# Patient Record
Sex: FEMALE | Race: WHITE | NOT HISPANIC OR LATINO | URBAN - METROPOLITAN AREA
[De-identification: names, ages, dates, MRNs, and addresses within clinical notes are randomized per-mention and may not be internally consistent; named-entity substitution may affect disease eponyms.]

---

## 2017-10-25 ENCOUNTER — OUTPATIENT (OUTPATIENT)
Dept: OUTPATIENT SERVICES | Facility: HOSPITAL | Age: 82
LOS: 1 days | Discharge: HOME | End: 2017-10-25

## 2017-10-25 DIAGNOSIS — Z00.00 ENCOUNTER FOR GENERAL ADULT MEDICAL EXAMINATION WITHOUT ABNORMAL FINDINGS: ICD-10-CM

## 2021-08-16 ENCOUNTER — INPATIENT (INPATIENT)
Facility: HOSPITAL | Age: 86
LOS: 1 days | Discharge: HOME | End: 2021-08-18
Attending: HOSPITALIST | Admitting: HOSPITALIST
Payer: MEDICARE

## 2021-08-16 VITALS
RESPIRATION RATE: 18 BRPM | OXYGEN SATURATION: 99 % | TEMPERATURE: 99 F | HEART RATE: 135 BPM | SYSTOLIC BLOOD PRESSURE: 210 MMHG | DIASTOLIC BLOOD PRESSURE: 87 MMHG

## 2021-08-16 LAB
ALBUMIN SERPL ELPH-MCNC: 4.3 G/DL — SIGNIFICANT CHANGE UP (ref 3.5–5.2)
ALP SERPL-CCNC: 85 U/L — SIGNIFICANT CHANGE UP (ref 30–115)
ALT FLD-CCNC: 9 U/L — SIGNIFICANT CHANGE UP (ref 0–41)
ANION GAP SERPL CALC-SCNC: 12 MMOL/L — SIGNIFICANT CHANGE UP (ref 7–14)
APPEARANCE UR: ABNORMAL
AST SERPL-CCNC: 15 U/L — SIGNIFICANT CHANGE UP (ref 0–41)
BACTERIA # UR AUTO: NEGATIVE — SIGNIFICANT CHANGE UP
BASOPHILS # BLD AUTO: 0.03 K/UL — SIGNIFICANT CHANGE UP (ref 0–0.2)
BASOPHILS NFR BLD AUTO: 0.3 % — SIGNIFICANT CHANGE UP (ref 0–1)
BILIRUB SERPL-MCNC: 1.2 MG/DL — SIGNIFICANT CHANGE UP (ref 0.2–1.2)
BILIRUB UR-MCNC: NEGATIVE — SIGNIFICANT CHANGE UP
BUN SERPL-MCNC: 10 MG/DL — SIGNIFICANT CHANGE UP (ref 10–20)
CALCIUM SERPL-MCNC: 9.5 MG/DL — SIGNIFICANT CHANGE UP (ref 8.5–10.1)
CHLORIDE SERPL-SCNC: 102 MMOL/L — SIGNIFICANT CHANGE UP (ref 98–110)
CO2 SERPL-SCNC: 26 MMOL/L — SIGNIFICANT CHANGE UP (ref 17–32)
COLOR SPEC: YELLOW — SIGNIFICANT CHANGE UP
CREAT SERPL-MCNC: 0.8 MG/DL — SIGNIFICANT CHANGE UP (ref 0.7–1.5)
D DIMER BLD IA.RAPID-MCNC: 298 NG/ML DDU — HIGH (ref 0–230)
DIFF PNL FLD: ABNORMAL
EOSINOPHIL # BLD AUTO: 0 K/UL — SIGNIFICANT CHANGE UP (ref 0–0.7)
EOSINOPHIL NFR BLD AUTO: 0 % — SIGNIFICANT CHANGE UP (ref 0–8)
EPI CELLS # UR: 1 /HPF — SIGNIFICANT CHANGE UP (ref 0–5)
GLUCOSE SERPL-MCNC: 130 MG/DL — HIGH (ref 70–99)
GLUCOSE UR QL: NEGATIVE — SIGNIFICANT CHANGE UP
HCT VFR BLD CALC: 42.6 % — SIGNIFICANT CHANGE UP (ref 37–47)
HGB BLD-MCNC: 14.3 G/DL — SIGNIFICANT CHANGE UP (ref 12–16)
HYALINE CASTS # UR AUTO: 4 /LPF — SIGNIFICANT CHANGE UP (ref 0–7)
IMM GRANULOCYTES NFR BLD AUTO: 0.3 % — SIGNIFICANT CHANGE UP (ref 0.1–0.3)
KETONES UR-MCNC: SIGNIFICANT CHANGE UP
LEUKOCYTE ESTERASE UR-ACNC: ABNORMAL
LIDOCAIN IGE QN: 46 U/L — SIGNIFICANT CHANGE UP (ref 7–60)
LYMPHOCYTES # BLD AUTO: 1.76 K/UL — SIGNIFICANT CHANGE UP (ref 1.2–3.4)
LYMPHOCYTES # BLD AUTO: 18.8 % — LOW (ref 20.5–51.1)
MAGNESIUM SERPL-MCNC: 1.7 MG/DL — LOW (ref 1.8–2.4)
MCHC RBC-ENTMCNC: 28.4 PG — SIGNIFICANT CHANGE UP (ref 27–31)
MCHC RBC-ENTMCNC: 33.6 G/DL — SIGNIFICANT CHANGE UP (ref 32–37)
MCV RBC AUTO: 84.5 FL — SIGNIFICANT CHANGE UP (ref 81–99)
MONOCYTES # BLD AUTO: 0.61 K/UL — HIGH (ref 0.1–0.6)
MONOCYTES NFR BLD AUTO: 6.5 % — SIGNIFICANT CHANGE UP (ref 1.7–9.3)
NEUTROPHILS # BLD AUTO: 6.93 K/UL — HIGH (ref 1.4–6.5)
NEUTROPHILS NFR BLD AUTO: 74.1 % — SIGNIFICANT CHANGE UP (ref 42.2–75.2)
NITRITE UR-MCNC: NEGATIVE — SIGNIFICANT CHANGE UP
NRBC # BLD: 0 /100 WBCS — SIGNIFICANT CHANGE UP (ref 0–0)
NT-PROBNP SERPL-SCNC: 274 PG/ML — SIGNIFICANT CHANGE UP (ref 0–300)
PH UR: 6 — SIGNIFICANT CHANGE UP (ref 5–8)
PLATELET # BLD AUTO: 212 K/UL — SIGNIFICANT CHANGE UP (ref 130–400)
POTASSIUM SERPL-MCNC: 3.6 MMOL/L — SIGNIFICANT CHANGE UP (ref 3.5–5)
POTASSIUM SERPL-SCNC: 3.6 MMOL/L — SIGNIFICANT CHANGE UP (ref 3.5–5)
PROT SERPL-MCNC: 7.5 G/DL — SIGNIFICANT CHANGE UP (ref 6–8)
PROT UR-MCNC: ABNORMAL
RBC # BLD: 5.04 M/UL — SIGNIFICANT CHANGE UP (ref 4.2–5.4)
RBC # FLD: 13.2 % — SIGNIFICANT CHANGE UP (ref 11.5–14.5)
RBC CASTS # UR COMP ASSIST: 12 /HPF — HIGH (ref 0–4)
SARS-COV-2 RNA SPEC QL NAA+PROBE: SIGNIFICANT CHANGE UP
SODIUM SERPL-SCNC: 140 MMOL/L — SIGNIFICANT CHANGE UP (ref 135–146)
SP GR SPEC: 1.02 — SIGNIFICANT CHANGE UP (ref 1.01–1.03)
TROPONIN T SERPL-MCNC: <0.01 NG/ML — SIGNIFICANT CHANGE UP
UROBILINOGEN FLD QL: SIGNIFICANT CHANGE UP
WBC # BLD: 9.36 K/UL — SIGNIFICANT CHANGE UP (ref 4.8–10.8)
WBC # FLD AUTO: 9.36 K/UL — SIGNIFICANT CHANGE UP (ref 4.8–10.8)
WBC UR QL: 142 /HPF — HIGH (ref 0–5)

## 2021-08-16 PROCEDURE — 93010 ELECTROCARDIOGRAM REPORT: CPT

## 2021-08-16 PROCEDURE — 71045 X-RAY EXAM CHEST 1 VIEW: CPT | Mod: 26

## 2021-08-16 PROCEDURE — 71275 CT ANGIOGRAPHY CHEST: CPT | Mod: 26,MA

## 2021-08-16 PROCEDURE — 99285 EMERGENCY DEPT VISIT HI MDM: CPT

## 2021-08-16 RX ORDER — CEFTRIAXONE 500 MG/1
1000 INJECTION, POWDER, FOR SOLUTION INTRAMUSCULAR; INTRAVENOUS ONCE
Refills: 0 | Status: COMPLETED | OUTPATIENT
Start: 2021-08-16 | End: 2021-08-16

## 2021-08-16 RX ORDER — SODIUM CHLORIDE 9 MG/ML
1000 INJECTION, SOLUTION INTRAVENOUS ONCE
Refills: 0 | Status: COMPLETED | OUTPATIENT
Start: 2021-08-16 | End: 2021-08-16

## 2021-08-16 RX ORDER — KETOROLAC TROMETHAMINE 30 MG/ML
30 SYRINGE (ML) INJECTION ONCE
Refills: 0 | Status: DISCONTINUED | OUTPATIENT
Start: 2021-08-16 | End: 2021-08-16

## 2021-08-16 RX ORDER — MAGNESIUM SULFATE 500 MG/ML
2 VIAL (ML) INJECTION ONCE
Refills: 0 | Status: COMPLETED | OUTPATIENT
Start: 2021-08-16 | End: 2021-08-16

## 2021-08-16 RX ADMIN — CEFTRIAXONE 100 MILLIGRAM(S): 500 INJECTION, POWDER, FOR SOLUTION INTRAMUSCULAR; INTRAVENOUS at 21:18

## 2021-08-16 RX ADMIN — Medication 50 GRAM(S): at 21:18

## 2021-08-16 RX ADMIN — Medication 30 MILLIGRAM(S): at 19:10

## 2021-08-16 RX ADMIN — SODIUM CHLORIDE 1000 MILLILITER(S): 9 INJECTION, SOLUTION INTRAVENOUS at 19:25

## 2021-08-16 NOTE — ED PROVIDER NOTE - PHYSICAL EXAMINATION
CONSTITUTIONAL: Well-developed; well-nourished; in no acute distress.   SKIN: warm, dry  HEAD: Normocephalic; atraumatic.  EYES: no conjunctival injection. PERRLA. EOMI.   ENT: No nasal discharge; airway clear.  NECK: Supple; non tender.  CARD: S1, S2 normal; Regular rate and rhythm. +tachycardic   RESP: No wheezes, rales or rhonchi.  ABD: soft ntnd.   EXT: Normal ROM.  +mild LE edema. Distal pulses symmetric.   LYMPH: No acute cervical adenopathy.  NEURO: Alert, oriented, grossly unremarkable. No FND.   PSYCH: Cooperative, appropriate.

## 2021-08-16 NOTE — ED PROVIDER NOTE - NS ED ROS FT
Review of Systems:  CONSTITUTIONAL: +fever, No diaphoresis   SKIN: No rash  HEMATOLOGIC: No abnormal bleeding   EYES: No eye pain, No blurred vision  ENT: No sore throat, No neck pain, No rhinorrhea, No ear pain  RESPIRATORY: +shortness of breath, No cough  CARDIAC: +chest pain, No palpitations  GI: No abdominal pain, No nausea, No vomiting, No diarrhea, No constipation, No bright red blood per rectum or melena. No flank pain.   : +dysuria, frequency, no hematuria.   MUSCULOSKELETAL: No joint paint, +leg swelling, No back pain  NEUROLOGIC: No numbness, No focal weakness, No headache, No dizziness  All other systems negative, unless specified in HPI

## 2021-08-16 NOTE — ED PROVIDER NOTE - OBJECTIVE STATEMENT
88 y/o F PMHx HLD, hypothyroidism presents to ED with left sided chest pain radiating to left arm since yesterday that is intermittent and worse with taking deep breaths. Pain is moderate, squeezing and worse w/ exertion. Pt also reporting fever at home Tmax 102. Reporting dysuria and urinary frequency. Pt called GRAHAM Swartz who told pt to come in for evaluation.

## 2021-08-16 NOTE — ED PROVIDER NOTE - ATTENDING CONTRIBUTION TO CARE
I personally evaluated the patient. I reviewed the Resident’s or Physician Assistant’s note (as assigned above), and agree with the findings and plan except as documented in my note.    88 y/o female with PMHx of HTN, HLD, hypothyroidism, frequent UTIs presented to ED with left sided chest pain radiating to left arm and neck. No back pain. No SOB. No fevers, chills. No n/v.     CONSTITUTIONAL: Well-developed; well-nourished; in no acute distress. Sitting up and providing appropriate history and physical examination  SKIN: skin exam is warm and dry, no acute rash.  HEAD: Normocephalic; atraumatic.  EYES: PERRL, 3 mm bilateral, no nystagmus, EOM intact; conjunctiva and sclera clear.  ENT: No nasal discharge; airway clear.  NECK: Supple; non tender.+ full passive ROM in all directions. No JVD  CARD: S1, S2 normal; no murmurs, gallops, or rubs. Regular rate and rhythm. + Symmetric Strong Pulses  RESP: No wheezes, rales or rhonchi. Good air movement bilaterally  ABD: soft; non-distended; non-tender. No Rebound, No Gaurding, No signs of peritnitis, No CVA tenderness  EXT: Normal ROM. No clubbing, cyanosis or edema. Dp and Pt Pulses intact. Cap refill less than 3 seconds  NEURO: CN 2-12 intact, normal finger to nose, normal romberg, stable gait, no sensory or motor deficits, Alert, oriented, grossly unremarkable. No Focal deficits. GCS 15. NIH 0  PSYCH: Cooperative, appropriate.    Plan- ECG, labs, CXR, reassess

## 2021-08-16 NOTE — ED ADULT NURSE REASSESSMENT NOTE - NS ED NURSE REASSESS COMMENT FT1
Pt received from previous RN Pt assessed. Pt is awake and alert. Pt denies any pain or discomfort at this time. Fluids running. Cardiaic and 02 monitoring maintained. Safety and comfort maintained

## 2021-08-17 DIAGNOSIS — Z98.890 OTHER SPECIFIED POSTPROCEDURAL STATES: Chronic | ICD-10-CM

## 2021-08-17 LAB
A1C WITH ESTIMATED AVERAGE GLUCOSE RESULT: 5.3 % — SIGNIFICANT CHANGE UP (ref 4–5.6)
ALBUMIN SERPL ELPH-MCNC: 3.8 G/DL — SIGNIFICANT CHANGE UP (ref 3.5–5.2)
ALP SERPL-CCNC: 74 U/L — SIGNIFICANT CHANGE UP (ref 30–115)
ALT FLD-CCNC: 7 U/L — SIGNIFICANT CHANGE UP (ref 0–41)
ANION GAP SERPL CALC-SCNC: 11 MMOL/L — SIGNIFICANT CHANGE UP (ref 7–14)
AST SERPL-CCNC: 14 U/L — SIGNIFICANT CHANGE UP (ref 0–41)
BASOPHILS # BLD AUTO: 0.03 K/UL — SIGNIFICANT CHANGE UP (ref 0–0.2)
BASOPHILS NFR BLD AUTO: 0.4 % — SIGNIFICANT CHANGE UP (ref 0–1)
BILIRUB SERPL-MCNC: 1.2 MG/DL — SIGNIFICANT CHANGE UP (ref 0.2–1.2)
BUN SERPL-MCNC: 11 MG/DL — SIGNIFICANT CHANGE UP (ref 10–20)
CALCIUM SERPL-MCNC: 9.2 MG/DL — SIGNIFICANT CHANGE UP (ref 8.5–10.1)
CHLORIDE SERPL-SCNC: 104 MMOL/L — SIGNIFICANT CHANGE UP (ref 98–110)
CHOLEST SERPL-MCNC: 161 MG/DL — SIGNIFICANT CHANGE UP
CK SERPL-CCNC: 81 U/L — SIGNIFICANT CHANGE UP (ref 0–225)
CO2 SERPL-SCNC: 26 MMOL/L — SIGNIFICANT CHANGE UP (ref 17–32)
CREAT SERPL-MCNC: 0.7 MG/DL — SIGNIFICANT CHANGE UP (ref 0.7–1.5)
EOSINOPHIL # BLD AUTO: 0.03 K/UL — SIGNIFICANT CHANGE UP (ref 0–0.7)
EOSINOPHIL NFR BLD AUTO: 0.4 % — SIGNIFICANT CHANGE UP (ref 0–8)
ESTIMATED AVERAGE GLUCOSE: 105 MG/DL — SIGNIFICANT CHANGE UP (ref 68–114)
GLUCOSE SERPL-MCNC: 103 MG/DL — HIGH (ref 70–99)
HCT VFR BLD CALC: 38.4 % — SIGNIFICANT CHANGE UP (ref 37–47)
HDLC SERPL-MCNC: 37 MG/DL — LOW
HGB BLD-MCNC: 12.5 G/DL — SIGNIFICANT CHANGE UP (ref 12–16)
IMM GRANULOCYTES NFR BLD AUTO: 0.3 % — SIGNIFICANT CHANGE UP (ref 0.1–0.3)
LIPID PNL WITH DIRECT LDL SERPL: 114 MG/DL — HIGH
LYMPHOCYTES # BLD AUTO: 2.14 K/UL — SIGNIFICANT CHANGE UP (ref 1.2–3.4)
LYMPHOCYTES # BLD AUTO: 31.7 % — SIGNIFICANT CHANGE UP (ref 20.5–51.1)
MAGNESIUM SERPL-MCNC: 1.7 MG/DL — LOW (ref 1.8–2.4)
MCHC RBC-ENTMCNC: 28.1 PG — SIGNIFICANT CHANGE UP (ref 27–31)
MCHC RBC-ENTMCNC: 32.6 G/DL — SIGNIFICANT CHANGE UP (ref 32–37)
MCV RBC AUTO: 86.3 FL — SIGNIFICANT CHANGE UP (ref 81–99)
MONOCYTES # BLD AUTO: 0.61 K/UL — HIGH (ref 0.1–0.6)
MONOCYTES NFR BLD AUTO: 9 % — SIGNIFICANT CHANGE UP (ref 1.7–9.3)
NEUTROPHILS # BLD AUTO: 3.92 K/UL — SIGNIFICANT CHANGE UP (ref 1.4–6.5)
NEUTROPHILS NFR BLD AUTO: 58.2 % — SIGNIFICANT CHANGE UP (ref 42.2–75.2)
NON HDL CHOLESTEROL: 124 MG/DL — SIGNIFICANT CHANGE UP
NRBC # BLD: 0 /100 WBCS — SIGNIFICANT CHANGE UP (ref 0–0)
PLATELET # BLD AUTO: 179 K/UL — SIGNIFICANT CHANGE UP (ref 130–400)
POTASSIUM SERPL-MCNC: 3.7 MMOL/L — SIGNIFICANT CHANGE UP (ref 3.5–5)
POTASSIUM SERPL-SCNC: 3.7 MMOL/L — SIGNIFICANT CHANGE UP (ref 3.5–5)
PROT SERPL-MCNC: 6.6 G/DL — SIGNIFICANT CHANGE UP (ref 6–8)
RBC # BLD: 4.45 M/UL — SIGNIFICANT CHANGE UP (ref 4.2–5.4)
RBC # FLD: 13.2 % — SIGNIFICANT CHANGE UP (ref 11.5–14.5)
SODIUM SERPL-SCNC: 141 MMOL/L — SIGNIFICANT CHANGE UP (ref 135–146)
TRIGL SERPL-MCNC: 103 MG/DL — SIGNIFICANT CHANGE UP
TROPONIN T SERPL-MCNC: <0.01 NG/ML — SIGNIFICANT CHANGE UP
TSH SERPL-MCNC: 9.57 UIU/ML — HIGH (ref 0.27–4.2)
WBC # BLD: 6.75 K/UL — SIGNIFICANT CHANGE UP (ref 4.8–10.8)
WBC # FLD AUTO: 6.75 K/UL — SIGNIFICANT CHANGE UP (ref 4.8–10.8)

## 2021-08-17 PROCEDURE — 93016 CV STRESS TEST SUPVJ ONLY: CPT

## 2021-08-17 PROCEDURE — 93010 ELECTROCARDIOGRAM REPORT: CPT

## 2021-08-17 PROCEDURE — 99223 1ST HOSP IP/OBS HIGH 75: CPT

## 2021-08-17 PROCEDURE — 93018 CV STRESS TEST I&R ONLY: CPT

## 2021-08-17 PROCEDURE — 78452 HT MUSCLE IMAGE SPECT MULT: CPT | Mod: 26

## 2021-08-17 PROCEDURE — 93306 TTE W/DOPPLER COMPLETE: CPT | Mod: 26

## 2021-08-17 RX ORDER — ACETAMINOPHEN 500 MG
650 TABLET ORAL EVERY 6 HOURS
Refills: 0 | Status: DISCONTINUED | OUTPATIENT
Start: 2021-08-17 | End: 2021-08-18

## 2021-08-17 RX ORDER — MAGNESIUM SULFATE 500 MG/ML
2 VIAL (ML) INJECTION ONCE
Refills: 0 | Status: COMPLETED | OUTPATIENT
Start: 2021-08-17 | End: 2021-08-17

## 2021-08-17 RX ORDER — ENOXAPARIN SODIUM 100 MG/ML
40 INJECTION SUBCUTANEOUS DAILY
Refills: 0 | Status: DISCONTINUED | OUTPATIENT
Start: 2021-08-17 | End: 2021-08-18

## 2021-08-17 RX ORDER — OXYBUTYNIN CHLORIDE 5 MG
5 TABLET ORAL
Refills: 0 | Status: DISCONTINUED | OUTPATIENT
Start: 2021-08-17 | End: 2021-08-18

## 2021-08-17 RX ORDER — HYDROCHLOROTHIAZIDE 25 MG
25 TABLET ORAL DAILY
Refills: 0 | Status: DISCONTINUED | OUTPATIENT
Start: 2021-08-17 | End: 2021-08-17

## 2021-08-17 RX ORDER — AMLODIPINE BESYLATE 2.5 MG/1
5 TABLET ORAL DAILY
Refills: 0 | Status: DISCONTINUED | OUTPATIENT
Start: 2021-08-17 | End: 2021-08-18

## 2021-08-17 RX ORDER — LEVOTHYROXINE SODIUM 125 MCG
75 TABLET ORAL DAILY
Refills: 0 | Status: DISCONTINUED | OUTPATIENT
Start: 2021-08-17 | End: 2021-08-18

## 2021-08-17 RX ORDER — TROSPIUM CHLORIDE 20 MG/1
1 TABLET, FILM COATED ORAL
Qty: 0 | Refills: 0 | DISCHARGE

## 2021-08-17 RX ORDER — REGADENOSON 0.08 MG/ML
0.4 INJECTION, SOLUTION INTRAVENOUS ONCE
Refills: 0 | Status: COMPLETED | OUTPATIENT
Start: 2021-08-17 | End: 2021-08-17

## 2021-08-17 RX ORDER — CEFTRIAXONE 500 MG/1
1000 INJECTION, POWDER, FOR SOLUTION INTRAMUSCULAR; INTRAVENOUS EVERY 24 HOURS
Refills: 0 | Status: DISCONTINUED | OUTPATIENT
Start: 2021-08-17 | End: 2021-08-18

## 2021-08-17 RX ORDER — LEVOTHYROXINE SODIUM 125 MCG
1 TABLET ORAL
Qty: 0 | Refills: 0 | DISCHARGE

## 2021-08-17 RX ADMIN — ENOXAPARIN SODIUM 40 MILLIGRAM(S): 100 INJECTION SUBCUTANEOUS at 11:31

## 2021-08-17 RX ADMIN — CEFTRIAXONE 100 MILLIGRAM(S): 500 INJECTION, POWDER, FOR SOLUTION INTRAMUSCULAR; INTRAVENOUS at 17:12

## 2021-08-17 RX ADMIN — Medication 75 MICROGRAM(S): at 06:11

## 2021-08-17 RX ADMIN — Medication 5 MILLIGRAM(S): at 06:11

## 2021-08-17 RX ADMIN — REGADENOSON 0.4 MILLIGRAM(S): 0.08 INJECTION, SOLUTION INTRAVENOUS at 14:00

## 2021-08-17 RX ADMIN — Medication 50 GRAM(S): at 09:08

## 2021-08-17 RX ADMIN — Medication 5 MILLIGRAM(S): at 17:13

## 2021-08-17 RX ADMIN — AMLODIPINE BESYLATE 5 MILLIGRAM(S): 2.5 TABLET ORAL at 11:30

## 2021-08-17 RX ADMIN — Medication 25 MILLIGRAM(S): at 06:11

## 2021-08-17 NOTE — CHART NOTE - NSCHARTNOTEFT_GEN_A_CORE
Pt seen and examined on medical rounds this morning.  She states that she had left shoulder and arm pain that persisted since Sunday.  She called PMD who advised her to go to the ER  She denies ever having chest pain or pressure.  Currently she feels well and wants to go home today.  EKG reviewed and troponin negative x 2.  she is willing to have nuclear stress test today (ordered)  BP now better (denies h/o HTN) - on amlodipine  + urinary symptoms and currently on ceftriaxone  can discharge on 3 day course of po abx   if nuclear stress test is negative for ischemia and pt remains stable, then she may be discharged home today with outpt f/u with PMD  lung nodule - needs f/u CT scan of chest in 12 months  needs cardio eval if nuclear stress test is abnormal      PROGRESS NOTE HANDOFF    Pending: nuclear stress test    pt aware of plan of care    Disposition: home

## 2021-08-17 NOTE — H&P ADULT - ASSESSMENT
86 y/o female with PMHx of HTN, HLD, hypothyroidism, frequent UTIs presented to ED with left sided chest pain radiating to left arm and neck.    #Chest Pain r/o ACS, likely stable angina   #Hypertensive Urgency  - typical chest pain, worse with exertion, improves on rest  - on admission /87, no signs of end organ damage   - Trop <0.01,   - restart HCTZ 25mg daily (home medication)  - Trend Trops  - monitor on Tele   - f/u Stress Test    #Frequent UTI  - on Doxycycline at home  - UA + LE, WBC, small blood, f/u UCx  - s/p Rocephin in ED, continue with Rocephin for now     #Hypothyroidism  - continue with synthroid  - check TSH    #HLD  - previously on simvastatin 20mg daily   - check Lipid panel        86 y/o female with PMHx of HTN, HLD, hypothyroidism, frequent UTIs presented to ED with left sided chest pain radiating to left arm and neck.    #Chest Pain r/o ACS, likely stable angina   #Hypertensive Urgency  - typical chest pain, worse with exertion, improves on rest  - on admission /87, no signs of end organ damage   - Trop <0.01,   - CTA negative for PE   - restart HCTZ 25mg daily (home medication)  - Trend Trops  - monitor on Tele   - f/u Stress Test  - f/u ECHO    #Frequent UTI  - on Doxycycline at home  - UA + LE, WBC, small blood, f/u UCx  - s/p Rocephin in ED, continue with Rocephin for now     #Hypothyroidism  - continue with synthroid  - check TSH    #HLD  - previously on simvastatin 20mg daily   - check Lipid panel     #Pulmonary Nodule  - 5mm pulmonary nodule noted on CTA, f/u CT in 12 months     DVT PPx: Lovenox   GI PPx: PPI  Diet: DASH   88 y/o female with PMHx of HTN, HLD, hypothyroidism, frequent UTIs presented to ED with left sided chest pain radiating to left arm and neck.    #Chest Pain r/o ACS, likely stable angina   #Hypertensive Urgency  - typical chest pain, worse with exertion, improves on rest  - on admission /87,  no signs of end organ damage   - EKG shows sinus tachycardia  - Trop <0.01,   - CTA negative for PE   - restart HCTZ 25mg daily (home medication)  - Trend Trops, repeat EKG   - monitor on Tele   - f/u Stress Test  - f/u ECHO    #Frequent UTI  - on Doxycycline at home, patient reports fever at home  - currently afebrile, no leukocytosis  - UA + LE, WBC, small blood, f/u UCx  - s/p Rocephin in ED, continue with Rocephin for now     #Hypothyroidism  - continue with synthroid  - check TSH    #HLD  - previously on simvastatin 20mg daily   - check Lipid panel     #Pulmonary Nodule  - 5mm pulmonary nodule noted on CTA, f/u CT in 12 months     DVT PPx: Lovenox   GI PPx: PPI  Diet: DASH

## 2021-08-17 NOTE — H&P ADULT - NSHPLABSRESULTS_GEN_ALL_CORE
14.3   9.36  )-----------( 212      ( 16 Aug 2021 19:30 )             42.6     -    140  |  102  |  10  ----------------------------<  130<H>  3.6   |  26  |  0.8    Ca    9.5      16 Aug 2021 19:30  Mg     1.7     -    TPro  7.5  /  Alb  4.3  /  TBili  1.2  /  DBili  x   /  AST  15  /  ALT  9   /  AlkPhos  85  08-16        CARDIAC MARKERS ( 16 Aug 2021 19:30 )  x     / <0.01 ng/mL / x     / x     / x            LIVER FUNCTIONS - ( 16 Aug 2021 19:30 )  Alb: 4.3 g/dL / Pro: 7.5 g/dL / ALK PHOS: 85 U/L / ALT: 9 U/L / AST: 15 U/L / GGT: x             COVID-19 PCR: NotDetec (21 @ 19:30)      Urinalysis Basic - ( 16 Aug 2021 20:19 )    Color: Yellow / Appearance: Slightly Turbid / S.019 / pH: x  Gluc: x / Ketone: Trace  / Bili: Negative / Urobili: <2 mg/dL   Blood: x / Protein: 30 mg/dL / Nitrite: Negative   Leuk Esterase: Large / RBC: 12 /HPF /  /HPF   Sq Epi: x / Non Sq Epi: 1 /HPF / Bacteria: Negative        RADIOLOGY & ADDITIONAL STUDIES:    [facility]  [facility]            PROCEDURE DATE:  2021            INTERPRETATION:  CLINICAL HISTORY/REASON FOR EXAM: Shortness of breath.    TECHNIQUE: Multislice helical sections were obtained from the thoracic inlet to the lung bases during rapid administration of intravenous contrast. Thin sections were reconstructed through the pulmonary vasculature. Study was performed as CT angiogram. 3D (MIP) reformats obtained.    COMPARISON: Correlation is made with abdominal and pelvic CT dated 2011      FINDINGS:    PULMONARY EMBOLUS: No pulmonary emboli.    LUNGS, PLEURA, AIRWAYS: No lobar consolidation, pleural effusion, or pneumothorax. No evidence of central endobronchial obstruction. Bilateral mosaic attenuation, likely secondary to air trapping. Bilateral areas of subsegmental atelectasis. Right lower lobe 5 mm pulmonary nodule (7-131).    THORACIC NODES: No mediastinal, hilar, supraclavicular, or axillary lymphadenopathy.    MEDIASTINUM/GREAT VESSELS: No pericardial effusion. Heart size is within normal limits. The aorta and main pulmonary artery are of normal caliber. Coronary artery and thoracic aortic calcifications.    BONES/SOFT TISSUES: Degenerative changes of the spine.    VISUALIZED UPPER ABDOMEN: Partially visualized right adrenal nodule measuring 1.9 cm, present as far back as  and compatible with an adenoma. Small hiatal hernia.      IMPRESSION:      No evidence of acute pulmonary embolus.    No evidence of acute intrathoracic pathology.    Right lower lobe 5 mm pulmonary nodule. Per Fleischner guideline, follow-up CT chest in 12 month can be obtained in high-risk patient.

## 2021-08-17 NOTE — H&P ADULT - NSHPPHYSICALEXAM_GEN_ALL_CORE
LOS:     VITALS:   T(C): 37 (08-17-21 @ 00:06), Max: 37.3 (08-16-21 @ 16:46)  HR: 98 (08-17-21 @ 00:06) (98 - 135)  BP: 188/81 (08-17-21 @ 00:06) (147/79 - 210/87)  RR: 18 (08-17-21 @ 00:06) (18 - 18)  SpO2: 96% (08-17-21 @ 00:06) (96% - 99%)    GENERAL: NAD, lying in bed comfortably  HEAD:  Atraumatic, Normocephalic  EYES: EOMI, PERRLA, conjunctiva and sclera clear  ENT: Moist mucous membranes  NECK: Supple, No JVD  CHEST/LUNG: Clear to auscultation bilaterally; No rales, rhonchi, wheezing, or rubs. Unlabored respirations  HEART: Regular rate and rhythm; No murmurs, rubs, or gallops  ABDOMEN: BSx4; Soft, nontender, nondistended  EXTREMITIES:  2+ Peripheral Pulses, brisk capillary refill. No clubbing, cyanosis, or edema  NERVOUS SYSTEM:  A&Ox3, no focal deficits   SKIN: No rashes or lesions VITALS:   T(C): 37 (08-17-21 @ 00:06), Max: 37.3 (08-16-21 @ 16:46)  HR: 98 (08-17-21 @ 00:06) (98 - 135)  BP: 188/81 (08-17-21 @ 00:06) (147/79 - 210/87)  RR: 18 (08-17-21 @ 00:06) (18 - 18)  SpO2: 96% (08-17-21 @ 00:06) (96% - 99%)    GENERAL: NAD, lying in bed comfortably  HEAD:  Atraumatic, Normocephalic  EYES: EOMI, PERRLA, conjunctiva and sclera clear  ENT: Moist mucous membranes  NECK: Supple, No JVD  CHEST/LUNG: Clear to auscultation bilaterally; No rales, rhonchi, wheezing, or rubs. Unlabored respirations  HEART: Regular rate and rhythm; No murmurs, rubs, or gallops  ABDOMEN: BSx4; Soft, nontender, nondistended  EXTREMITIES:  2+ Peripheral Pulses, brisk capillary refill. No clubbing, cyanosis, or edema  NERVOUS SYSTEM:  A&Ox3, no focal deficits

## 2021-08-17 NOTE — H&P ADULT - ATTENDING COMMENTS
88 YO F with a PMH of HTN, HLD, hypothyroidism, and frequent UTIs who presents to the hospital with a c/o CP for the past x 2 days. Described as sharp, left-sided, radiating to LUE, and intermittent (episodes lasting minutes). + worse with exertion. Associated with - SOB, - nausea, - palpitations, and - diaphoresis. Did not take SLN or ASA prior to arrival. ROS positive for fever and dysuria. Denies any fevers/chills, cough, ABD pain, LE swelling, headaches, or rashes.     Of note, pt is currently on ABXs (Doxy) for UTI    In the ED, cardiac enzymes were negative and an EKG showed sinus tachycardia. CTA-chest showed no acute process. UA was positive. Started on IV ABXs (Ceftriaxone) and IVFs (LR).     Family hx of early heart disease (-)     Physical exam shows pt in NAD, resting comfortably. VSS, afebrile, not hypoxic on RA. A&Ox3. Neuro exam intact. CTA B/L with no W/C/R. RRR, no M/G/R. ABD is soft and non-tender to palpation, normoactive BSs. LEs without swelling, pulses palpated bilaterally. No rashes. Labs and imaging as above resident note.     Chest pain, typical, likely stable angina rule out ACS. Admit to tele. Serial cardiac enzymes and EKGs. A1c, Lipids, and TSH. Echo. PRN pain meds. Restart home meds.     Urinary tract infection; no sepsis present on admission. FU cultures. IVFs (LR). PRN pain meds. IV ABXs (Ceftriaxone).     Magnesium deficiency. Replace. No QTc prolongation present.     Hypertensive urgency. Restart home meds. Monitor VSs.     Right-sided pulmonary nodule, incidental finding. Pt made aware that they will need out-pt FU. Teach back performed. Pulm out-pt FU    HX of HLD and hypothyroidism. Restart home meds, except as stated above. DVT PPX. Inform PCP of pt's admission to hospital. My note supersedes the residents note.

## 2021-08-17 NOTE — H&P ADULT - HISTORY OF PRESENT ILLNESS
88 y/o female with PMHx of HLD, hypothyroidism presents to ED with left sided chest pain radiating to left arm since yesterday that is intermittent and worse with taking deep breaths. Pain is moderate, squeezing and worse w/ exertion. Pt also reporting fever at home Tmax 102. Reporting dysuria and urinary frequency. Pt called GRAHAM Swartz who told pt to come in for evaluation 86 y/o female with PMHx of HTN, HLD, hypothyroidism, frequent UTIs presented to ED with left sided chest pain radiating to left arm and neck. She reports that on Sunday she was resting when this pain had started. She reports it was a sharp pain, worse with exertion, 6/10 in severity. Currently she has no chest pain, and it had improved on its own. She also noted that she had a fever around 102 on Sunday. She has frequent UTIs on and off since July, currently on doxycycline as outpatient. She had contacted her PCP who recommended to come to ED. She denies any headache, SOB, diaphoresis, abdominal pain, dysuria, nausea, vomiting. She does however admits to increased urinary frequency.     In the ED, /87, , T 99.2. UA +LE, WBC, small blood. CTA negative for PE. Notable for coronary and thoracic aortic calcifications. 3mm pulmonary nodule. s/p 1L LR, Rocephin in ED.

## 2021-08-18 ENCOUNTER — TRANSCRIPTION ENCOUNTER (OUTPATIENT)
Age: 86
End: 2021-08-18

## 2021-08-18 VITALS — OXYGEN SATURATION: 94 %

## 2021-08-18 LAB
ALBUMIN SERPL ELPH-MCNC: 3.9 G/DL — SIGNIFICANT CHANGE UP (ref 3.5–5.2)
ALP SERPL-CCNC: 77 U/L — SIGNIFICANT CHANGE UP (ref 30–115)
ALT FLD-CCNC: 10 U/L — SIGNIFICANT CHANGE UP (ref 0–41)
ANION GAP SERPL CALC-SCNC: 17 MMOL/L — HIGH (ref 7–14)
AST SERPL-CCNC: 24 U/L — SIGNIFICANT CHANGE UP (ref 0–41)
BASOPHILS # BLD AUTO: 0.04 K/UL — SIGNIFICANT CHANGE UP (ref 0–0.2)
BASOPHILS NFR BLD AUTO: 0.5 % — SIGNIFICANT CHANGE UP (ref 0–1)
BILIRUB SERPL-MCNC: 1.2 MG/DL — SIGNIFICANT CHANGE UP (ref 0.2–1.2)
BUN SERPL-MCNC: 11 MG/DL — SIGNIFICANT CHANGE UP (ref 10–20)
CALCIUM SERPL-MCNC: 9.4 MG/DL — SIGNIFICANT CHANGE UP (ref 8.5–10.1)
CHLORIDE SERPL-SCNC: 101 MMOL/L — SIGNIFICANT CHANGE UP (ref 98–110)
CO2 SERPL-SCNC: 23 MMOL/L — SIGNIFICANT CHANGE UP (ref 17–32)
COVID-19 SPIKE DOMAIN AB INTERP: POSITIVE
COVID-19 SPIKE DOMAIN ANTIBODY RESULT: >250 U/ML — HIGH
CREAT SERPL-MCNC: 0.6 MG/DL — LOW (ref 0.7–1.5)
EOSINOPHIL # BLD AUTO: 0.02 K/UL — SIGNIFICANT CHANGE UP (ref 0–0.7)
EOSINOPHIL NFR BLD AUTO: 0.2 % — SIGNIFICANT CHANGE UP (ref 0–8)
GLUCOSE SERPL-MCNC: 116 MG/DL — HIGH (ref 70–99)
HCT VFR BLD CALC: 39.2 % — SIGNIFICANT CHANGE UP (ref 37–47)
HGB BLD-MCNC: 13.2 G/DL — SIGNIFICANT CHANGE UP (ref 12–16)
IMM GRANULOCYTES NFR BLD AUTO: 0.2 % — SIGNIFICANT CHANGE UP (ref 0.1–0.3)
LYMPHOCYTES # BLD AUTO: 1.76 K/UL — SIGNIFICANT CHANGE UP (ref 1.2–3.4)
LYMPHOCYTES # BLD AUTO: 21.1 % — SIGNIFICANT CHANGE UP (ref 20.5–51.1)
MAGNESIUM SERPL-MCNC: 1.8 MG/DL — SIGNIFICANT CHANGE UP (ref 1.8–2.4)
MCHC RBC-ENTMCNC: 28.7 PG — SIGNIFICANT CHANGE UP (ref 27–31)
MCHC RBC-ENTMCNC: 33.7 G/DL — SIGNIFICANT CHANGE UP (ref 32–37)
MCV RBC AUTO: 85.2 FL — SIGNIFICANT CHANGE UP (ref 81–99)
MONOCYTES # BLD AUTO: 0.64 K/UL — HIGH (ref 0.1–0.6)
MONOCYTES NFR BLD AUTO: 7.7 % — SIGNIFICANT CHANGE UP (ref 1.7–9.3)
NEUTROPHILS # BLD AUTO: 5.88 K/UL — SIGNIFICANT CHANGE UP (ref 1.4–6.5)
NEUTROPHILS NFR BLD AUTO: 70.3 % — SIGNIFICANT CHANGE UP (ref 42.2–75.2)
NRBC # BLD: 0 /100 WBCS — SIGNIFICANT CHANGE UP (ref 0–0)
PLATELET # BLD AUTO: 158 K/UL — SIGNIFICANT CHANGE UP (ref 130–400)
POTASSIUM SERPL-MCNC: 3.5 MMOL/L — SIGNIFICANT CHANGE UP (ref 3.5–5)
POTASSIUM SERPL-SCNC: 3.5 MMOL/L — SIGNIFICANT CHANGE UP (ref 3.5–5)
PROT SERPL-MCNC: 6.9 G/DL — SIGNIFICANT CHANGE UP (ref 6–8)
RBC # BLD: 4.6 M/UL — SIGNIFICANT CHANGE UP (ref 4.2–5.4)
RBC # FLD: 13.2 % — SIGNIFICANT CHANGE UP (ref 11.5–14.5)
SARS-COV-2 IGG+IGM SERPL QL IA: >250 U/ML — HIGH
SARS-COV-2 IGG+IGM SERPL QL IA: POSITIVE
SODIUM SERPL-SCNC: 141 MMOL/L — SIGNIFICANT CHANGE UP (ref 135–146)
WBC # BLD: 8.36 K/UL — SIGNIFICANT CHANGE UP (ref 4.8–10.8)
WBC # FLD AUTO: 8.36 K/UL — SIGNIFICANT CHANGE UP (ref 4.8–10.8)

## 2021-08-18 PROCEDURE — 99239 HOSP IP/OBS DSCHRG MGMT >30: CPT

## 2021-08-18 RX ORDER — AMLODIPINE BESYLATE 2.5 MG/1
1 TABLET ORAL
Qty: 0 | Refills: 0 | DISCHARGE
Start: 2021-08-18

## 2021-08-18 RX ORDER — ACETAMINOPHEN 500 MG
2 TABLET ORAL
Qty: 0 | Refills: 0 | DISCHARGE
Start: 2021-08-18

## 2021-08-18 RX ADMIN — AMLODIPINE BESYLATE 5 MILLIGRAM(S): 2.5 TABLET ORAL at 06:39

## 2021-08-18 RX ADMIN — ENOXAPARIN SODIUM 40 MILLIGRAM(S): 100 INJECTION SUBCUTANEOUS at 12:45

## 2021-08-18 RX ADMIN — Medication 5 MILLIGRAM(S): at 06:39

## 2021-08-18 RX ADMIN — Medication 75 MICROGRAM(S): at 06:39

## 2021-08-18 NOTE — DISCHARGE NOTE PROVIDER - HOSPITAL COURSE
88 y/o female with PMHx of HTN, HLD, hypothyroidism, frequent UTIs presented to ED with left sided chest pain radiating to left arm and neck. She reports that on Sunday she was resting when this pain had started. She reports it was a sharp pain, worse with exertion, 6/10 in severity. Currently she has no chest pain, and it had improved on its own. She also noted that she had a fever around 102 on Sunday. She has frequent UTIs on and off since July, currently on doxycycline as outpatient. She had contacted her PCP who recommended to come to ED. She denies any headache, SOB, diaphoresis, abdominal pain, dysuria, nausea, vomiting. She does however admits to increased urinary frequency.   In the ED, /87, , T 99.2. UA +LE, WBC, small blood. CTA negative for PE. Notable for coronary and thoracic aortic calcifications. 3mm pulmonary nodule. s/p 1L LR, Rocephin in ED.   CE x 2 negative  Nuclear stress test negative for ischemia  D/C Abx. No evidence of UTI  Tylenol PRN fo pain  Cont her home meds

## 2021-08-18 NOTE — DISCHARGE NOTE NURSING/CASE MANAGEMENT/SOCIAL WORK - PATIENT PORTAL LINK FT
You can access the FollowMyHealth Patient Portal offered by [facility] by registering at the following website: [email or web address]. By joining [facility]’s FollowMyHealth portal, you will also be able to view your health information using other applications (apps) compatible with our system.

## 2021-08-18 NOTE — PROGRESS NOTE ADULT - ASSESSMENT
86 y/o female with PMHx of HTN, HLD, hypothyroidism, frequent UTIs presented to ED with left sided chest pain radiating to left arm and neck. She reports that on Sunday she was resting when this pain had started. She reports it was a sharp pain, worse with exertion, 6/10 in severity.       CP, L arm pain - musculoskeletal  HTN / DL            PLAN:    ·	CE x 2 negative  ·	Nuclear stress test negative for ischemia  ·	D/C Abx. No evidence of UTI  ·	Tylenol PRN fo pain  ·	Cont her home meds and D/C her home 86 y/o female with PMHx of HTN, HLD, hypothyroidism, frequent UTIs presented to ED with left sided chest pain radiating to left arm and neck. She reports that on Sunday she was resting when this pain had started. She reports it was a sharp pain, worse with exertion, 6/10 in severity.       L arm and Shoulder pain - musculoskeletal  HTN / DL            PLAN:    ·	CE x 2 negative  ·	Nuclear stress test negative for ischemia  ·	D/C Abx. No evidence of UTI  ·	Tylenol PRN fo pain  ·	Cont her home meds and D/C her home    * Med rec reviewed. Plan of care D/W the pt. Time spent 33 minutes.

## 2021-08-18 NOTE — DISCHARGE NOTE PROVIDER - NSDCCPCAREPLAN_GEN_ALL_CORE_FT
PRINCIPAL DISCHARGE DIAGNOSIS  Diagnosis: Chest pain  Assessment and Plan of Treatment: You came here for chest pain and bilateral shoulder pain. All cardiac work up are negative. Acute Myocardial infarction ruled out. You found to have musculoskeletal pain. Please contiune to take your medication as prescribed. And follow up with your PCP within 2 weeks. Please do not hesitate to come back to ER if you have any chest pain, palpitations or shortness of breath.      SECONDARY DISCHARGE DIAGNOSES  Diagnosis: Chronic UTI  Assessment and Plan of Treatment: You don't have any symptoms at this time. we discontinued antibiotics. Please follow up with your PCP within 2 weeks after discharge.

## 2021-08-18 NOTE — DISCHARGE NOTE NURSING/CASE MANAGEMENT/SOCIAL WORK - NSDCPEFALRISK_GEN_ALL_CORE
For information on Fall & injury Prevention, visit [email or web address]

## 2021-08-18 NOTE — DISCHARGE NOTE PROVIDER - CARE PROVIDER_API CALL
Dominga Ta  INTERNAL MEDICINE  [address] [place] [id number]  Phone: [phone]  Fax: [phone]  Established Patient  Follow Up Time: 2 weeks

## 2021-08-18 NOTE — DISCHARGE NOTE PROVIDER - NSDCMRMEDTOKEN_GEN_ALL_CORE_FT
acetaminophen 325 mg oral tablet: 2 tab(s) orally every 6 hours, As needed, Temp greater or equal to 38.5C (101.3F), Mild Pain (1 - 3)  amLODIPine 5 mg oral tablet: 1 tab(s) orally once a day  Synthroid 75 mcg (0.075 mg) oral tablet: 1 tab(s) orally once a day  trospium 20 mg oral tablet: 1 tab(s) orally once a day

## 2021-08-23 DIAGNOSIS — I25.10 ATHEROSCLEROTIC HEART DISEASE OF NATIVE CORONARY ARTERY WITHOUT ANGINA PECTORIS: ICD-10-CM

## 2021-08-23 DIAGNOSIS — E61.2 MAGNESIUM DEFICIENCY: ICD-10-CM

## 2021-08-23 DIAGNOSIS — R07.9 CHEST PAIN, UNSPECIFIED: ICD-10-CM

## 2021-08-23 DIAGNOSIS — E78.5 HYPERLIPIDEMIA, UNSPECIFIED: ICD-10-CM

## 2021-08-23 DIAGNOSIS — I70.0 ATHEROSCLEROSIS OF AORTA: ICD-10-CM

## 2021-08-23 DIAGNOSIS — R91.1 SOLITARY PULMONARY NODULE: ICD-10-CM

## 2021-08-23 DIAGNOSIS — I16.0 HYPERTENSIVE URGENCY: ICD-10-CM

## 2021-08-23 DIAGNOSIS — E03.9 HYPOTHYROIDISM, UNSPECIFIED: ICD-10-CM

## 2021-08-23 DIAGNOSIS — R07.89 OTHER CHEST PAIN: ICD-10-CM

## 2021-08-23 DIAGNOSIS — R31.9 HEMATURIA, UNSPECIFIED: ICD-10-CM

## 2023-09-08 PROBLEM — E78.5 HYPERLIPIDEMIA, UNSPECIFIED: Chronic | Status: ACTIVE | Noted: 2021-08-17

## 2023-09-08 PROBLEM — E03.9 HYPOTHYROIDISM, UNSPECIFIED: Chronic | Status: ACTIVE | Noted: 2021-08-17

## 2023-09-29 ENCOUNTER — APPOINTMENT (OUTPATIENT)
Dept: ORTHOPEDIC SURGERY | Facility: CLINIC | Age: 88
End: 2023-09-29
Payer: MEDICARE

## 2023-09-29 PROBLEM — Z00.00 ENCOUNTER FOR PREVENTIVE HEALTH EXAMINATION: Status: ACTIVE | Noted: 2023-09-29

## 2023-09-29 PROCEDURE — 73560 X-RAY EXAM OF KNEE 1 OR 2: CPT | Mod: 50

## 2023-09-29 PROCEDURE — 20610 DRAIN/INJ JOINT/BURSA W/O US: CPT | Mod: RT

## 2023-09-29 PROCEDURE — 99203 OFFICE O/P NEW LOW 30 MIN: CPT | Mod: 25

## 2023-12-14 ENCOUNTER — APPOINTMENT (OUTPATIENT)
Dept: OBGYN | Facility: CLINIC | Age: 88
End: 2023-12-14
Payer: MEDICARE

## 2023-12-14 ENCOUNTER — LABORATORY RESULT (OUTPATIENT)
Age: 88
End: 2023-12-14

## 2023-12-14 VITALS
BODY MASS INDEX: 34.07 KG/M2 | DIASTOLIC BLOOD PRESSURE: 94 MMHG | HEIGHT: 59 IN | WEIGHT: 169 LBS | SYSTOLIC BLOOD PRESSURE: 140 MMHG

## 2023-12-14 DIAGNOSIS — N81.2 INCOMPLETE UTEROVAGINAL PROLAPSE: ICD-10-CM

## 2023-12-14 PROCEDURE — 99204 OFFICE O/P NEW MOD 45 MIN: CPT

## 2023-12-17 NOTE — PHYSICAL EXAM
[Chaperone Present] : A chaperone was present in the examining room during all aspects of the physical examination [Appropriately responsive] : appropriately responsive [Alert] : alert [No Acute Distress] : no acute distress [Soft] : soft [Non-tender] : non-tender [Non-distended] : non-distended [No HSM] : No HSM [No Lesions] : no lesions [No Mass] : no mass [Oriented x3] : oriented x3 [Examination Of The Breasts] : a normal appearance [No Masses] : no breast masses were palpable [Vulvar Atrophy] : vulvar atrophy [Vulvitis] : vulvitis [Labia Majora] : normal [Labia Minora] : normal [Atrophy] : atrophy [Cystocele] : a cystocele [Uterine Prolapse] : uterine prolapse [No Bleeding] : There was no active vaginal bleeding [Normal] : normal [Uterine Adnexae] : non-palpable

## 2023-12-17 NOTE — PLAN
[FreeTextEntry1] : POP and overactive bladder. Vulvovaginal Atrophy Vulvitis I believe her UTI symptoms are due to her vulvitis and not an actual UTI.  WIll get UA/C&S treat vulvitis with lotrisone treat atrophy with estradiol  Referral to Urogyn. Patient has an appointment in January or february.

## 2023-12-17 NOTE — HISTORY OF PRESENT ILLNESS
[FreeTextEntry1] : 89 y/o , postmenopausal, here today as a new GYN.  Patient c/o urinary tract infections and constant pain for the past 6 months.  Chronic UTIs for 6 months - occur intermittnet. Abx help, and then symptoms return in 1-2 months.  Patient also c/o burning in vagina.  Patient c/o urinary frequency, urgency, and nocturia.   Denies abnormal vaginal discharge, pelvic pain.   Denies hx of abnormal PAP, STIs, fibroids, or ovarian cysts. Denies PMB.  Hx uterine polyps.    Last Annual:  Last PAP:  Last mammo: 2018 Last DEXA: 2019 Last colonscopy:   GynHx: Age of menarche: 15 years old      Sexual Hx: .    GYN Sx: polyp removal  Surgical Hx: Colon & bowel repair     OB Hx: 1     PMH: hypothyroidism.  Meds: atenolol 50mg, levothyroxine 100mcg, celecoxib 200mg All: NKDA  Social Hx: Nonsmoker, no alcohol or recreational drug use. Family Hx: Mom: breast cancer.

## 2023-12-21 LAB
APPEARANCE: ABNORMAL
BACTERIA UR CULT: NORMAL
BILIRUBIN URINE: ABNORMAL
BLOOD URINE: ABNORMAL
COLOR: NORMAL
GLUCOSE QUALITATIVE U: NEGATIVE MG/DL
KETONES URINE: ABNORMAL MG/DL
LEUKOCYTE ESTERASE URINE: ABNORMAL
NITRITE URINE: NEGATIVE
PH URINE: 5.5
PROTEIN URINE: 100 MG/DL
SPECIFIC GRAVITY URINE: 1.02
UROBILINOGEN URINE: 1 MG/DL

## 2024-01-04 ENCOUNTER — RX RENEWAL (OUTPATIENT)
Age: 89
End: 2024-01-04

## 2024-01-04 RX ORDER — MELOXICAM 15 MG/1
15 TABLET ORAL
Qty: 30 | Refills: 2 | Status: ACTIVE | COMMUNITY
Start: 2023-09-29 | End: 1900-01-01

## 2024-01-18 ENCOUNTER — NON-APPOINTMENT (OUTPATIENT)
Age: 89
End: 2024-01-18

## 2024-01-18 ENCOUNTER — APPOINTMENT (OUTPATIENT)
Dept: OBGYN | Facility: CLINIC | Age: 89
End: 2024-01-18
Payer: MEDICARE

## 2024-01-18 DIAGNOSIS — N95.2 POSTMENOPAUSAL ATROPHIC VAGINITIS: ICD-10-CM

## 2024-01-18 DIAGNOSIS — N94.89 OTHER SPECIFIED CONDITIONS ASSOCIATED WITH FEMALE GENITAL ORGANS AND MENSTRUAL CYCLE: ICD-10-CM

## 2024-01-18 PROCEDURE — 99214 OFFICE O/P EST MOD 30 MIN: CPT

## 2024-01-23 LAB
BACTERIA UR CULT: ABNORMAL
BV BACTERIA RRNA VAG QL NAA+PROBE: NOT DETECTED
C GLABRATA RNA VAG QL NAA+PROBE: NOT DETECTED
C TRACH RRNA SPEC QL NAA+PROBE: NOT DETECTED
CANDIDA RRNA VAG QL PROBE: DETECTED
N GONORRHOEA RRNA SPEC QL NAA+PROBE: NOT DETECTED
T VAGINALIS RRNA SPEC QL NAA+PROBE: NOT DETECTED

## 2024-01-23 NOTE — PLAN
[FreeTextEntry1] : Probably UTI Atrophic vulvitis and vaginitis. Severe chornic vulvitis  WIll treat empirically with cipro gabapentin for the vulvar burning/pain Lidocaine cream  stop lotrisone and estrogen cream for now.  f/u 1 week.

## 2024-01-23 NOTE — HISTORY OF PRESENT ILLNESS
[FreeTextEntry1] : Patient here c/o uti symptoms She is also c/o persistent vaginal burning. She was given lotrisone which initially helped but now it is no longer helping and is also burning when she uses it. She has also used the estrogen cream which may be slightly helping.

## 2024-01-23 NOTE — PHYSICAL EXAM
[Vulvar Atrophy] : vulvar atrophy [Vulvitis] : vulvitis [Labia Majora] : normal [Labia Minora] : normal [Atrophy] : atrophy [Normal] : normal [Uterine Adnexae] : normal

## 2024-01-25 ENCOUNTER — APPOINTMENT (OUTPATIENT)
Dept: OBGYN | Facility: CLINIC | Age: 89
End: 2024-01-25
Payer: MEDICARE

## 2024-01-25 DIAGNOSIS — N76.3 SUBACUTE AND CHRONIC VULVITIS: ICD-10-CM

## 2024-01-25 PROCEDURE — 99442: CPT | Mod: 93

## 2024-02-21 ENCOUNTER — APPOINTMENT (OUTPATIENT)
Dept: UROGYNECOLOGY | Facility: CLINIC | Age: 89
End: 2024-02-21

## 2024-03-07 ENCOUNTER — APPOINTMENT (OUTPATIENT)
Dept: ORTHOPEDIC SURGERY | Facility: CLINIC | Age: 89
End: 2024-03-07

## 2024-05-21 ENCOUNTER — APPOINTMENT (OUTPATIENT)
Dept: ORTHOPEDIC SURGERY | Facility: CLINIC | Age: 89
End: 2024-05-21
Payer: MEDICARE

## 2024-05-21 DIAGNOSIS — M17.0 BILATERAL PRIMARY OSTEOARTHRITIS OF KNEE: ICD-10-CM

## 2024-05-21 PROCEDURE — 20610 DRAIN/INJ JOINT/BURSA W/O US: CPT | Mod: 50

## 2024-05-21 PROCEDURE — 99213 OFFICE O/P EST LOW 20 MIN: CPT | Mod: 25

## 2024-05-21 NOTE — ASSESSMENT
[FreeTextEntry1] : 89-year-old woman with mild to moderate bilateral knee arthritis.  We had a long discussion.  Option.  Recommend she continue with her self-treated physical therapy program.  In addition would recommend continued judicious use of meloxicam.  Lastly at her request we can provide her with a cortisone injection.  Procedure: With the patient's consent and at his/her request utilizing standard sterile technique patient was provided an injection of lidocaine 1% (4 cc), Marcaine 0.25% (4 cc) and Depo-Medrol 5 mcg/cc (2 cc) into the both knees through a anterior medial portal.  Patient injection without issue.  Postinjection precautions were discussed.

## 2024-05-21 NOTE — IMAGING
[de-identified] : Pleasant older woman walks into my office without undue antalgia.  She was able to get onto and off the exam table without assistance.  Physical examination: Bilateral knees: Tenderness palpation along the medial joint line.  Mildly abnormal patellofemoral grind test.  No lateral joint line tenderness.  Moderate synovial thickening.  No effusion.  Knee motion 0-115 degrees.  Calf soft no cords.  No geniculate nodes or masses.  Radiographs: Deferred

## 2024-05-21 NOTE — HISTORY OF PRESENT ILLNESS
[de-identified] : 89-year-old woman well-known to my office returns for follow-up bilateral knee arthritis.  In September she had cortisone injections in her knee which she found very helpful.  She still takes meloxicam on an as-needed basis.  She is now able to walk better up until the cortisone started to wane a month ago.  She denies any new trauma.  Does not utilize a walker or cane to ambulate.  Does not utilize braces.  Continues with a self-directed exercise program.  Would like to have another set of cortisone injections.

## 2024-06-03 ENCOUNTER — LABORATORY RESULT (OUTPATIENT)
Age: 89
End: 2024-06-03

## 2024-06-04 ENCOUNTER — APPOINTMENT (OUTPATIENT)
Dept: UROGYNECOLOGY | Facility: CLINIC | Age: 89
End: 2024-06-04
Payer: MEDICARE

## 2024-06-04 VITALS
HEIGHT: 59 IN | DIASTOLIC BLOOD PRESSURE: 76 MMHG | WEIGHT: 169 LBS | HEART RATE: 76 BPM | BODY MASS INDEX: 34.07 KG/M2 | SYSTOLIC BLOOD PRESSURE: 155 MMHG

## 2024-06-04 DIAGNOSIS — K59.09 OTHER CONSTIPATION: ICD-10-CM

## 2024-06-04 DIAGNOSIS — N39.41 URGE INCONTINENCE: ICD-10-CM

## 2024-06-04 DIAGNOSIS — Z78.9 OTHER SPECIFIED HEALTH STATUS: ICD-10-CM

## 2024-06-04 DIAGNOSIS — Z82.49 FAMILY HISTORY OF ISCHEMIC HEART DISEASE AND OTHER DISEASES OF THE CIRCULATORY SYSTEM: ICD-10-CM

## 2024-06-04 DIAGNOSIS — Z80.3 FAMILY HISTORY OF MALIGNANT NEOPLASM OF BREAST: ICD-10-CM

## 2024-06-04 DIAGNOSIS — R30.0 DYSURIA: ICD-10-CM

## 2024-06-04 DIAGNOSIS — Z80.1 FAMILY HISTORY OF MALIGNANT NEOPLASM OF TRACHEA, BRONCHUS AND LUNG: ICD-10-CM

## 2024-06-04 DIAGNOSIS — Z82.3 FAMILY HISTORY OF STROKE: ICD-10-CM

## 2024-06-04 DIAGNOSIS — Z83.3 FAMILY HISTORY OF DIABETES MELLITUS: ICD-10-CM

## 2024-06-04 DIAGNOSIS — R35.1 NOCTURIA: ICD-10-CM

## 2024-06-04 DIAGNOSIS — N39.0 URINARY TRACT INFECTION, SITE NOT SPECIFIED: ICD-10-CM

## 2024-06-04 DIAGNOSIS — N32.81 OVERACTIVE BLADDER: ICD-10-CM

## 2024-06-04 PROCEDURE — 99459 PELVIC EXAMINATION: CPT

## 2024-06-04 PROCEDURE — 51701 INSERT BLADDER CATHETER: CPT

## 2024-06-04 PROCEDURE — 99205 OFFICE O/P NEW HI 60 MIN: CPT | Mod: 25

## 2024-06-04 RX ORDER — LIDOCAINE 4% 4 G/100G
4 CREAM TOPICAL
Qty: 1 | Refills: 0 | Status: COMPLETED | COMMUNITY
Start: 2024-01-18 | End: 2024-06-04

## 2024-06-04 RX ORDER — ESTRADIOL 0.1 MG/G
0.1 CREAM VAGINAL
Qty: 1 | Refills: 1 | Status: COMPLETED | COMMUNITY
Start: 2023-12-14 | End: 2024-06-04

## 2024-06-04 RX ORDER — CLOTRIMAZOLE AND BETAMETHASONE DIPROPIONATE 10; .5 MG/G; MG/G
1-0.05 CREAM TOPICAL TWICE DAILY
Qty: 1 | Refills: 2 | Status: COMPLETED | COMMUNITY
Start: 2023-12-14 | End: 2024-06-04

## 2024-06-04 RX ORDER — SULFAMETHOXAZOLE AND TRIMETHOPRIM 800; 160 MG/1; MG/1
800-160 TABLET ORAL
Qty: 6 | Refills: 0 | Status: ACTIVE | COMMUNITY
Start: 2024-06-04 | End: 1900-01-01

## 2024-06-04 RX ORDER — CIPROFLOXACIN HYDROCHLORIDE 500 MG/1
500 TABLET, FILM COATED ORAL
Qty: 14 | Refills: 0 | Status: COMPLETED | COMMUNITY
Start: 2024-01-18 | End: 2024-06-04

## 2024-06-04 RX ORDER — GABAPENTIN 300 MG/1
300 CAPSULE ORAL
Qty: 90 | Refills: 0 | Status: COMPLETED | COMMUNITY
Start: 2024-01-18 | End: 2024-06-04

## 2024-06-04 RX ORDER — LEVOTHYROXINE SODIUM 100 UG/1
100 CAPSULE ORAL
Refills: 0 | Status: ACTIVE | COMMUNITY

## 2024-06-04 RX ORDER — ATENOLOL 100 MG/1
100 TABLET ORAL
Refills: 0 | Status: ACTIVE | COMMUNITY

## 2024-06-05 PROBLEM — N32.81 OVERACTIVE BLADDER: Status: RESOLVED | Noted: 2023-12-14 | Resolved: 2024-06-05

## 2024-06-05 PROBLEM — N39.0 RECURRENT UTI: Status: ACTIVE | Noted: 2024-01-18

## 2024-06-05 PROBLEM — K59.09 CHRONIC CONSTIPATION: Status: ACTIVE | Noted: 2024-06-05

## 2024-06-05 PROBLEM — R35.1 NOCTURIA: Status: ACTIVE | Noted: 2024-06-05

## 2024-06-05 PROBLEM — N39.41 URGE INCONTINENCE OF URINE: Status: ACTIVE | Noted: 2024-06-05

## 2024-06-05 NOTE — ASSESSMENT
[FreeTextEntry1] : Shayan - Discussed possible etiology with patient. Discussed workup consisting of imaging and cystoscopy. Discussed treatment options. She will call with symptoms of UTI and will return for cystoscopy. Will discuss prophylactic options for rUTIs at the next visit.  Nocturia - Discussed etiology of the condition with the patient. Discussed management options, including first line management options consisting of diet and lifestyle modifications, second line options consisting of medications, and third line options. Reviewed PTNS, bladder botox, and Interstim. Discussed R/B/A of anticholinergics versus b-3 agonists. Patient will start with fluid modification and behavioral modification.  Constipation - Discussed importance of avoidance of constipation with patient. She will start a bowel regimen as follows: For better bowel emptying please use Benefiber start with 2 tablespoons daily and as needed add 1 teaspoon of Miralax titrate up or down to effect.

## 2024-06-05 NOTE — REASON FOR VISIT
[TextEntry] : Reason for visit: New Patient Voids per day: 6-7 Voids per night: 8   Urge incontinence: Occasionally (+) urgency Stress incontinence: No Constipation: Occasionally, uses ducolax, stool softeners and miralax daily   Fecal incontinence: Rarely Vaginal bulge: yes or no

## 2024-06-05 NOTE — HISTORY OF PRESENT ILLNESS
[FreeTextEntry1] : 89 year para 1 ( x1) presents with complaints of frequent UTIs. She also thinks maybe her bladder dropped 6 months ago but she currently doesn't have this problem. She also reports frequent urination. She has been taking cranberry pills. Last UTI was 2024. Typical UTI sxs: burning with urination Currently has dysuria.  UCx: 2024 - >100K E coli pansensitive 10/4/2023 - >100K Proteus R nitrofurantoin, I tobramycin  Pelvic organ prolapse: no bulge, no pressure/heaviness  Stress urinary incontinence: no x/week no prior incontinence procedures  Overactive bladder syndrome: daily frequency 6-7 x/day, 8 x/night,  occasional urgency,  <1 x/week UUI episodes,    1 pads/day     Bladder irritants include coffee,    Prior OAB meds no  Voiding dysfunction: no Incomplete bladder emptying, rare hesitancy  Lower urinary tract/vaginal symptoms: 3-4 UTIs per year, no hematuria, + dysuria, no bladder pain  7 BM/week   + constipation (daily dulcolax and stool softener)   Fecal incontinence no  Sexually active no    Pelvic pain no   Vaginal dryness no (used vaginal estrogen and stopped due to burning)   LMP age 50   PMB no

## 2024-06-05 NOTE — COUNSELING
[FreeTextEntry1] : Please return to see Dr. Foster for a cystoscopy.  Please obtain an ultrasound of your kidneys.  Please start taking the following antibiotic: Bactrim  To reduce urinary frequency at night, please restrict all fluid intake 2-3 hours prior to bedtime. Please also elevate your feet throughout the day.  For better bowel emptying please use Benefiber (or metamucil) start with 2 tablespoons daily and as needed add 1 teaspoon of Miralax titrate up or down to effect.

## 2024-06-05 NOTE — PHYSICAL EXAM
[Chaperone Present] : A chaperone was present in the examining room during all aspects of the physical examination [87252] : A chaperone was present during the pelvic exam. [FreeTextEntry2] : Ginger [FreeTextEntry1] : Void:  5cc  PVR:  15cc  Urethra was prepped in sterile fashion and then a sterile 14F catheter was used by me to drain the bladder for her symptoms of nocturia. Patient tolerated the procedure well  Well healed incision: vertical   -empty cough stress test  +atrophy  -urethral caruncle  -vestibular tenderness  mild prolapse  -urethral hypermobility  -pelvic floor dysfunction  -urethral tenderness  -bladder tenderness  normal cervix  unable to palpate uterus  +adnexa nonpalpable  +good sphincter tone  -enterocele  +good rectal squeeze  +intact sacral nerves  1/5 Oleg

## 2024-06-07 LAB
APPEARANCE: ABNORMAL
BILIRUBIN URINE: ABNORMAL
BLOOD URINE: ABNORMAL
COLOR: NORMAL
GLUCOSE QUALITATIVE U: NEGATIVE MG/DL
KETONES URINE: ABNORMAL MG/DL
LEUKOCYTE ESTERASE URINE: ABNORMAL
NITRITE URINE: NEGATIVE
PH URINE: 5.5
PROTEIN URINE: 100 MG/DL
SPECIFIC GRAVITY URINE: 1.03
UROBILINOGEN URINE: 1 MG/DL

## 2024-06-10 DIAGNOSIS — N39.0 URINARY TRACT INFECTION, SITE NOT SPECIFIED: ICD-10-CM

## 2024-06-10 LAB — URINE CULTURE <10: ABNORMAL

## 2024-06-10 RX ORDER — AMOXICILLIN 875 MG/1
875 TABLET, FILM COATED ORAL
Qty: 10 | Refills: 0 | Status: ACTIVE | COMMUNITY
Start: 2024-06-10 | End: 1900-01-01

## 2024-06-22 ENCOUNTER — OUTPATIENT (OUTPATIENT)
Dept: OUTPATIENT SERVICES | Facility: HOSPITAL | Age: 89
LOS: 1 days | End: 2024-06-22
Payer: MEDICARE

## 2024-06-22 ENCOUNTER — RESULT REVIEW (OUTPATIENT)
Age: 89
End: 2024-06-22

## 2024-06-22 DIAGNOSIS — N39.0 URINARY TRACT INFECTION, SITE NOT SPECIFIED: ICD-10-CM

## 2024-06-22 DIAGNOSIS — Z98.890 OTHER SPECIFIED POSTPROCEDURAL STATES: Chronic | ICD-10-CM

## 2024-06-22 DIAGNOSIS — Z00.8 ENCOUNTER FOR OTHER GENERAL EXAMINATION: ICD-10-CM

## 2024-06-22 PROCEDURE — 76775 US EXAM ABDO BACK WALL LIM: CPT

## 2024-06-22 PROCEDURE — 76775 US EXAM ABDO BACK WALL LIM: CPT | Mod: 26

## 2024-06-23 DIAGNOSIS — N39.0 URINARY TRACT INFECTION, SITE NOT SPECIFIED: ICD-10-CM

## 2024-07-02 ENCOUNTER — APPOINTMENT (OUTPATIENT)
Dept: UROGYNECOLOGY | Facility: CLINIC | Age: 89
End: 2024-07-02

## 2024-11-08 ENCOUNTER — APPOINTMENT (OUTPATIENT)
Dept: ORTHOPEDIC SURGERY | Facility: CLINIC | Age: 89
End: 2024-11-08

## 2025-05-25 ENCOUNTER — INPATIENT (INPATIENT)
Facility: HOSPITAL | Age: 89
LOS: 0 days | Discharge: HOME | DRG: 554 | End: 2025-05-26
Attending: HOSPITALIST | Admitting: STUDENT IN AN ORGANIZED HEALTH CARE EDUCATION/TRAINING PROGRAM
Payer: MEDICARE

## 2025-05-25 VITALS
TEMPERATURE: 97 F | OXYGEN SATURATION: 96 % | WEIGHT: 166.89 LBS | HEART RATE: 70 BPM | SYSTOLIC BLOOD PRESSURE: 173 MMHG | RESPIRATION RATE: 18 BRPM | DIASTOLIC BLOOD PRESSURE: 81 MMHG

## 2025-05-25 DIAGNOSIS — Z98.890 OTHER SPECIFIED POSTPROCEDURAL STATES: Chronic | ICD-10-CM

## 2025-05-25 DIAGNOSIS — R26.2 DIFFICULTY IN WALKING, NOT ELSEWHERE CLASSIFIED: ICD-10-CM

## 2025-05-25 LAB
ALBUMIN SERPL ELPH-MCNC: 4.1 G/DL — SIGNIFICANT CHANGE UP (ref 3.5–5.2)
ALP SERPL-CCNC: 94 U/L — SIGNIFICANT CHANGE UP (ref 30–115)
ALT FLD-CCNC: 9 U/L — SIGNIFICANT CHANGE UP (ref 0–41)
ANION GAP SERPL CALC-SCNC: 11 MMOL/L — SIGNIFICANT CHANGE UP (ref 7–14)
AST SERPL-CCNC: 19 U/L — SIGNIFICANT CHANGE UP (ref 0–41)
BASOPHILS # BLD AUTO: 0.05 K/UL — SIGNIFICANT CHANGE UP (ref 0–0.2)
BASOPHILS NFR BLD AUTO: 0.7 % — SIGNIFICANT CHANGE UP (ref 0–1)
BILIRUB SERPL-MCNC: 0.6 MG/DL — SIGNIFICANT CHANGE UP (ref 0.2–1.2)
BUN SERPL-MCNC: 11 MG/DL — SIGNIFICANT CHANGE UP (ref 10–20)
CALCIUM SERPL-MCNC: 10 MG/DL — SIGNIFICANT CHANGE UP (ref 8.4–10.5)
CHLORIDE SERPL-SCNC: 103 MMOL/L — SIGNIFICANT CHANGE UP (ref 98–110)
CO2 SERPL-SCNC: 26 MMOL/L — SIGNIFICANT CHANGE UP (ref 17–32)
CREAT SERPL-MCNC: 0.7 MG/DL — SIGNIFICANT CHANGE UP (ref 0.7–1.5)
CRP SERPL-MCNC: <3 MG/L — SIGNIFICANT CHANGE UP
EGFR: 82 ML/MIN/1.73M2 — SIGNIFICANT CHANGE UP
EGFR: 82 ML/MIN/1.73M2 — SIGNIFICANT CHANGE UP
EOSINOPHIL # BLD AUTO: 0.15 K/UL — SIGNIFICANT CHANGE UP (ref 0–0.7)
EOSINOPHIL NFR BLD AUTO: 2.2 % — SIGNIFICANT CHANGE UP (ref 0–8)
GLUCOSE SERPL-MCNC: 115 MG/DL — HIGH (ref 70–99)
HCT VFR BLD CALC: 41 % — SIGNIFICANT CHANGE UP (ref 37–47)
HGB BLD-MCNC: 13.6 G/DL — SIGNIFICANT CHANGE UP (ref 12–16)
IMM GRANULOCYTES NFR BLD AUTO: 0.3 % — SIGNIFICANT CHANGE UP (ref 0.1–0.3)
LYMPHOCYTES # BLD AUTO: 1.64 K/UL — SIGNIFICANT CHANGE UP (ref 1.2–3.4)
LYMPHOCYTES # BLD AUTO: 24.4 % — SIGNIFICANT CHANGE UP (ref 20.5–51.1)
MCHC RBC-ENTMCNC: 28.5 PG — SIGNIFICANT CHANGE UP (ref 27–31)
MCHC RBC-ENTMCNC: 33.2 G/DL — SIGNIFICANT CHANGE UP (ref 32–37)
MCV RBC AUTO: 86 FL — SIGNIFICANT CHANGE UP (ref 81–99)
MONOCYTES # BLD AUTO: 0.4 K/UL — SIGNIFICANT CHANGE UP (ref 0.1–0.6)
MONOCYTES NFR BLD AUTO: 6 % — SIGNIFICANT CHANGE UP (ref 1.7–9.3)
NEUTROPHILS # BLD AUTO: 4.45 K/UL — SIGNIFICANT CHANGE UP (ref 1.4–6.5)
NEUTROPHILS NFR BLD AUTO: 66.4 % — SIGNIFICANT CHANGE UP (ref 42.2–75.2)
NRBC BLD AUTO-RTO: 0 /100 WBCS — SIGNIFICANT CHANGE UP (ref 0–0)
PLATELET # BLD AUTO: 178 K/UL — SIGNIFICANT CHANGE UP (ref 130–400)
PMV BLD: 11.8 FL — HIGH (ref 7.4–10.4)
POTASSIUM SERPL-MCNC: 4.2 MMOL/L — SIGNIFICANT CHANGE UP (ref 3.5–5)
POTASSIUM SERPL-SCNC: 4.2 MMOL/L — SIGNIFICANT CHANGE UP (ref 3.5–5)
PROT SERPL-MCNC: 7 G/DL — SIGNIFICANT CHANGE UP (ref 6–8)
RBC # BLD: 4.77 M/UL — SIGNIFICANT CHANGE UP (ref 4.2–5.4)
RBC # FLD: 13.5 % — SIGNIFICANT CHANGE UP (ref 11.5–14.5)
SODIUM SERPL-SCNC: 140 MMOL/L — SIGNIFICANT CHANGE UP (ref 135–146)
WBC # BLD: 6.71 K/UL — SIGNIFICANT CHANGE UP (ref 4.8–10.8)
WBC # FLD AUTO: 6.71 K/UL — SIGNIFICANT CHANGE UP (ref 4.8–10.8)

## 2025-05-25 PROCEDURE — 73562 X-RAY EXAM OF KNEE 3: CPT | Mod: 26,LT

## 2025-05-25 PROCEDURE — 36415 COLL VENOUS BLD VENIPUNCTURE: CPT

## 2025-05-25 PROCEDURE — 86140 C-REACTIVE PROTEIN: CPT

## 2025-05-25 PROCEDURE — 99223 1ST HOSP IP/OBS HIGH 75: CPT

## 2025-05-25 PROCEDURE — 99285 EMERGENCY DEPT VISIT HI MDM: CPT

## 2025-05-25 PROCEDURE — 85652 RBC SED RATE AUTOMATED: CPT

## 2025-05-25 PROCEDURE — 97162 PT EVAL MOD COMPLEX 30 MIN: CPT | Mod: GP

## 2025-05-25 RX ORDER — LEVOTHYROXINE SODIUM 300 MCG
100 TABLET ORAL DAILY
Refills: 0 | Status: DISCONTINUED | OUTPATIENT
Start: 2025-05-25 | End: 2025-05-26

## 2025-05-25 RX ORDER — AMLODIPINE BESYLATE 10 MG/1
5 TABLET ORAL DAILY
Refills: 0 | Status: DISCONTINUED | OUTPATIENT
Start: 2025-05-25 | End: 2025-05-26

## 2025-05-25 RX ORDER — CELECOXIB 50 MG/1
200 CAPSULE ORAL
Refills: 0 | Status: DISCONTINUED | OUTPATIENT
Start: 2025-05-25 | End: 2025-05-25

## 2025-05-25 RX ORDER — ENOXAPARIN SODIUM 100 MG/ML
40 INJECTION SUBCUTANEOUS EVERY 24 HOURS
Refills: 0 | Status: DISCONTINUED | OUTPATIENT
Start: 2025-05-25 | End: 2025-05-26

## 2025-05-25 RX ORDER — CALCIUM CARBONATE 750 MG/1
1 TABLET ORAL DAILY
Refills: 0 | Status: DISCONTINUED | OUTPATIENT
Start: 2025-05-25 | End: 2025-05-26

## 2025-05-25 RX ORDER — LIDOCAINE HYDROCHLORIDE 20 MG/ML
1 JELLY TOPICAL EVERY 24 HOURS
Refills: 0 | Status: DISCONTINUED | OUTPATIENT
Start: 2025-05-25 | End: 2025-05-26

## 2025-05-25 RX ORDER — ACETAMINOPHEN 500 MG/5ML
975 LIQUID (ML) ORAL ONCE
Refills: 0 | Status: COMPLETED | OUTPATIENT
Start: 2025-05-25 | End: 2025-05-25

## 2025-05-25 RX ORDER — LEVOTHYROXINE SODIUM 300 MCG
1 TABLET ORAL
Refills: 0 | DISCHARGE

## 2025-05-25 RX ORDER — ACETAMINOPHEN 500 MG/5ML
650 LIQUID (ML) ORAL EVERY 6 HOURS
Refills: 0 | Status: DISCONTINUED | OUTPATIENT
Start: 2025-05-25 | End: 2025-05-26

## 2025-05-25 RX ORDER — LISINOPRIL 30 MG/1
50 TABLET ORAL DAILY
Refills: 0 | Status: DISCONTINUED | OUTPATIENT
Start: 2025-05-25 | End: 2025-05-26

## 2025-05-25 RX ORDER — CALCIUM CARBONATE 750 MG/1
1 TABLET ORAL
Refills: 0 | DISCHARGE

## 2025-05-25 RX ORDER — LISINOPRIL 30 MG/1
1 TABLET ORAL
Refills: 0 | DISCHARGE

## 2025-05-25 RX ADMIN — LIDOCAINE HYDROCHLORIDE 1 PATCH: 20 JELLY TOPICAL at 21:05

## 2025-05-25 RX ADMIN — ENOXAPARIN SODIUM 40 MILLIGRAM(S): 100 INJECTION SUBCUTANEOUS at 21:05

## 2025-05-25 RX ADMIN — Medication 975 MILLIGRAM(S): at 14:33

## 2025-05-25 NOTE — H&P ADULT - NSHPPHYSICALEXAM_GEN_ALL_CORE
LOS:     VITALS:   T(C): 36.3 (05-25-25 @ 13:43), Max: 36.3 (05-25-25 @ 13:43)  HR: 70 (05-25-25 @ 13:43) (70 - 70)  BP: 173/81 (05-25-25 @ 13:43) (173/81 - 173/81)  RR: 18 (05-25-25 @ 13:43) (18 - 18)  SpO2: 96% (05-25-25 @ 13:43) (96% - 96%)    GENERAL: NAD, lying in bed comfortably  HEAD:  Atraumatic, Normocephalic  EYES: EOMI, PERRLA, conjunctiva and sclera clear  ENT: Moist mucous membranes  NECK: Supple, No JVD  CHEST/LUNG: Clear to auscultation bilaterally; No rales, rhonchi, wheezing, or rubs. Unlabored respirations  HEART: Regular rate and rhythm; No murmurs, rubs, or gallops  ABDOMEN: BSx4; Soft, nontender, nondistended  EXTREMITIES:  2+ Peripheral Pulses, brisk capillary refill. No clubbing, cyanosis, or edema. BL knee swelling with no erythema. Pain with ROM of left knee  NERVOUS SYSTEM:  A&Ox3, no focal deficits   SKIN: No rashes or lesions

## 2025-05-25 NOTE — ED PROVIDER NOTE - PHYSICAL EXAMINATION
Physical Exam    Vital Signs: I have reviewed the initial vital signs.  Constitutional: appears stated age, no acute distress  Eyes: Conjunctiva pink, Sclera clear.   Cardiovascular: S1 and S2, regular rate, regular rhythm, well-perfused extremities, radial pulses equal and 2+, pedal pulses 2+ and equal  Respiratory: unlabored respiratory effort, clear to auscultation bilaterally no wheezing, rales and rhonchi  Gastrointestinal: soft, non-tender abdomen, no pulsatile mass, normal bowl sounds  Musculoskeletal: supple neck, no lower extremity edema, no midline tenderness, left knee ttp, rom intact.   Integumentary: warm, dry, no rash  Neurologic: awake, alert, nvi

## 2025-05-25 NOTE — ED PROVIDER NOTE - OBJECTIVE STATEMENT
89 yo female, pmh of htn, hypothyroidism, presents to er for left knee pain. pt has chronic left knee pain however today worse, needed walker to ambulate and knee gave out on her- unable to ambulate 2/2 to pain, no falls or head injury. denies numbness, tingling, fever, chills.

## 2025-05-25 NOTE — ED PROVIDER NOTE - CLINICAL SUMMARY MEDICAL DECISION MAKING FREE TEXT BOX
Patient presents with left knee pain.  Mild tenderness on exam.  X-rays negative for fracture or dislocation.  Patient unable to ambulate.  Will admit for rehab and further management.

## 2025-05-25 NOTE — ED ADULT TRIAGE NOTE - CHIEF COMPLAINT QUOTE
Pt c/o L knee pain x1 day. Pt was unable to walk at [facility] today bc of the pain. Pt admits that both legs have been bothering her for the past couple of days. Denies fall or trauma.

## 2025-05-25 NOTE — PATIENT PROFILE ADULT - FALL HARM RISK - HARM RISK INTERVENTIONS
Assistance with ambulation/Assistance OOB with selected safe patient handling equipment/Communicate Risk of Fall with Harm to all staff/Discuss with provider need for PT consult/Monitor gait and stability/Reinforce activity limits and safety measures with patient and family/Tailored Fall Risk Interventions/Visual Cue: Yellow wristband and red socks/Bed in lowest position, wheels locked, appropriate side rails in place/Call bell, personal items and telephone in reach/Instruct patient to call for assistance before getting out of bed or chair/Non-slip footwear when patient is out of bed/[place] to call system/Physically safe environment - no spills, clutter or unnecessary equipment/Purposeful Proactive Rounding/Room/bathroom lighting operational, light cord in reach

## 2025-05-25 NOTE — ED ADULT NURSE NOTE - CHIEF COMPLAINT QUOTE
Pt c/o L knee pain x1 day. Pt was unable to walk at [employer] today bc of the pain. Pt admits that both legs have been bothering her for the past couple of days. Denies fall or trauma.

## 2025-05-25 NOTE — H&P ADULT - ASSESSMENT
89 yo female, pmh of htn, hypothyroidism BL knee OA presents to er for left knee pain. pt has chronic BL knee pain. Receives steroid injections intermittently Last injection 9 months ago. today worse pain, needed walker to ambulate and knee gave out on her- unable to ambulate 2/2 to pain, no falls or head injury. denies numbness, tingling, fever, chills.    /81, otherwise wnl  x-rays knee performed  received tylenol    #BL knee OA  - unable to ambulate on LLE  - PT  - if pt unable to ambulate with PT and pain control then ortho c/s for steroid injection  - cont meloxicam    #HTN  - start amlodipine  - pt takes atenolol, per pt for fast HR although denies Afib, continue for now    #Hypothyroid  - cont home med    DVT proph-   Diet-   Act order- PT  AM labs  chg  pain control

## 2025-05-25 NOTE — ED PROVIDER NOTE - ATTENDING APP SHARED VISIT CONTRIBUTION OF CARE
89-year-old female past med history hypertension, hypothyroid presents with left knee pain.  Patient was using a walker walking out of [employer] down a ramp when she felt sudden pain to her left knee.  No fall or trauma.  Has been having worsening pain to both legs worsening left last couple days but this pain is even more than she is having.  Patient lives alone.    CONSTITUTIONAL: Well-developed; well-nourished; in no acute distress.   SKIN: warm, dry  HEAD: Normocephalic; atraumatic.  EYES: PERRL, EOMI, no conjunctival erythema  ENT: No nasal discharge; airway clear.  NECK: Supple; non tender.  EXT: Normal ROM.  5/5 strength in all 4 extremities. + tenderness to the medial aspect of the left knee. range of motion limited by pain. no effusion.   NEURO: Alert, oriented, grossly unremarkable. neurovascularly intact

## 2025-05-25 NOTE — H&P ADULT - HISTORY OF PRESENT ILLNESS
89 yo female, pmh of htn, hypothyroidism, presents to er for left knee pain. pt has chronic left knee pain however today worse, needed walker to ambulate and knee gave out on her- unable to ambulate 2/2 to pain, no falls or head injury. denies numbness, tingling, fever, chills.    /81, otherwise wnl  tylnel  x-rays knee performed  received tylenol     89 yo female, pmh of htn, hypothyroidism BL knee OA presents to er for left knee pain. pt has chronic BL knee pain. Receives steroid injections intermittently Last injection 9 months ago. today worse pain, needed walker to ambulate and knee gave out on her- unable to ambulate 2/2 to pain, no falls or head injury. denies numbness, tingling, fever, chills.    /81, otherwise wnl  x-rays knee performed  received tylenol

## 2025-05-25 NOTE — H&P ADULT - ATTENDING COMMENTS
Assessment    Difficulty ambulating due to chronic left knee pain  HTN  Hypothyroid    Plan    - c/w tylenol, f/u pain mgmt, lidocaine patch, no tenderness to palpation, states pain mainly occurs when she flexes it and puts weight on it, f/u xray read, esr,crp, no leukocytosis and no fever, pain has been chronic but getting worse  - c/w amlodipine, atenolol  - c/w home synthroid    Pending: pain mgmt, PT, xray read    # DVT PPX: lovenox    GENERAL: NAD, lying in bed comfortably  HEAD:  Atraumatic, normocephalic  NERVOUS SYSTEM:  A&Ox3, moving all extremities, no focal deficits   EYES: EOMI, PERRL  NECK: Supple, trachea midline, no JVD  HEART: Regular rate and rhythm  LUNGS: Clear to auscultation bilaterally, no crackles, wheezing, or rhonchi  ABDOMEN: Soft, nontender, nondistended, +BS  EXTREMITIES: 2+ peripheral pulses bilaterally. No clubbing, cyanosis, or edema    75 minutes spent on review of labs, imaging studies, old records, obtaining history, personally examining patient, counselling and communicating with patient/ family, entering orders for medications/tests/etc, discussions with other health care providers, documentation in electronic health records, independent interpretation of labs, imaging/procedure results and care coordination.

## 2025-05-26 ENCOUNTER — TRANSCRIPTION ENCOUNTER (OUTPATIENT)
Age: 89
End: 2025-05-26

## 2025-05-26 VITALS — TEMPERATURE: 98 F | HEART RATE: 65 BPM | SYSTOLIC BLOOD PRESSURE: 138 MMHG | DIASTOLIC BLOOD PRESSURE: 70 MMHG

## 2025-05-26 LAB — ERYTHROCYTE [SEDIMENTATION RATE] IN BLOOD: 12 MM/HR — SIGNIFICANT CHANGE UP (ref 0–20)

## 2025-05-26 PROCEDURE — 99239 HOSP IP/OBS DSCHRG MGMT >30: CPT

## 2025-05-26 RX ORDER — LIDOCAINE HYDROCHLORIDE 20 MG/ML
1 JELLY TOPICAL
Qty: 30 | Refills: 0
Start: 2025-05-26 | End: 2025-06-24

## 2025-05-26 RX ORDER — AMLODIPINE BESYLATE 10 MG/1
1 TABLET ORAL
Qty: 90 | Refills: 0
Start: 2025-05-26 | End: 2025-08-23

## 2025-05-26 RX ORDER — MELOXICAM 15 MG/1
1 TABLET ORAL
Refills: 0 | DISCHARGE

## 2025-05-26 RX ADMIN — CALCIUM CARBONATE 1 TABLET(S): 750 TABLET ORAL at 11:07

## 2025-05-26 RX ADMIN — AMLODIPINE BESYLATE 5 MILLIGRAM(S): 10 TABLET ORAL at 05:28

## 2025-05-26 RX ADMIN — Medication 1 APPLICATION(S): at 05:30

## 2025-05-26 RX ADMIN — Medication 100 MICROGRAM(S): at 05:28

## 2025-05-26 RX ADMIN — LISINOPRIL 50 MILLIGRAM(S): 30 TABLET ORAL at 05:29

## 2025-05-26 NOTE — PHYSICAL THERAPY INITIAL EVALUATION ADULT - PERTINENT HX OF CURRENT PROBLEM, REHAB EVAL
89 yo female, pmh of htn, hypothyroidism BL knee OA presents to er for left knee pain. pt has chronic BL knee pain. Receives steroid injections intermittently Last injection 9 months ago. today worse pain, needed walker to ambulate and knee gave out on her- unable to ambulate 2/2 to pain, no falls or head injury. denies numbness, tingling, fever, chills.

## 2025-05-26 NOTE — DISCHARGE NOTE PROVIDER - NSDCMRMEDTOKEN_GEN_ALL_CORE_FT
acetaminophen 325 mg oral tablet: 2 tab(s) orally every 6 hours, As needed, Temp greater or equal to 38.5C (101.3F), Mild Pain (1 - 3)  amLODIPine 5 mg oral tablet: 1 tab(s) orally once a day  atenolol 50 mg oral tablet: 1 tab(s) orally once a day  calcium (as carbonate) 500 mg oral tablet, chewable: 1 tab(s) chewed once a day  Levothroid 100 mcg (0.1 mg) oral tablet: 1 tab(s) orally once a day  lidocaine 4% topical film: Apply topically to affected area once a day

## 2025-05-26 NOTE — PHYSICAL THERAPY INITIAL EVALUATION ADULT - MD/RN NOTIFIED
Procedure: Colonoscopy  Date: 11/15/2024  Physician performing: Dr. Tucker  Location of procedure:  [place]  Instructions given to patient: Clenpiq  Diabetic: N/A  Clearances: N/A    Patient follow up is as needed   
yes

## 2025-05-26 NOTE — DISCHARGE NOTE NURSING/CASE MANAGEMENT/SOCIAL WORK - PATIENT PORTAL LINK FT
You can access the FollowMyHealth Patient Portal offered by [facility] by registering at the following website: [email or web address]. By joining [email or web address] FollowMyHealth portal, you will also be able to view your health information using other applications (apps) compatible with our system.

## 2025-05-26 NOTE — DISCHARGE NOTE PROVIDER - CARE PROVIDER_API CALL
Dominga Ta  Internal Medicine  [address] [place] [id number]  Phone: [phone]  Fax: [phone]  Follow Up Time: 2 weeks   Dominga Ta  Internal Medicine  [facility] [place] [address]  Phone: [phone]  Fax: [phone]  Follow Up Time: 2 weeks    [facility]  Orthopaedic Surgery  [address] [place] [id number]  Phone: [phone]  Fax: [phone]  Follow Up Time:

## 2025-05-26 NOTE — DISCHARGE NOTE NURSING/CASE MANAGEMENT/SOCIAL WORK - NSDCPEFALRISK_GEN_ALL_CORE
For information on Fall & Injury Prevention, visit: [email or web address] OR  [email or web address] OR  https://www.cdc.gov/steadi/patient.html

## 2025-05-26 NOTE — DISCHARGE NOTE PROVIDER - HOSPITAL COURSE
89 yo female, pmh of htn, hypothyroidism BL knee OA presents to er for left knee pain. Pt has chronic BL knee pain. Receives steroid injections intermittently Last injection 9 months ago. today worse pain, needed walker to ambulate and knee gave out on her- unable to ambulate 2/2 to pain, no falls or head injury. denies numbness, tingling, fever, chills.    In ED  VS: /81, otherwise wnl  Received Tylenol and admitted to [facility]    Hospital Course     #B/L knee pain 2/2 OA  - unable to ambulate on LLE  - L Knee X-ray: No acute fracture or dislocation. Mild tricompartmental osteoarthritis. Chondrocalcinosis. There is flattening of the medial femoral condyle, likely sequela of prior subchondral insufficiency fracture. Vascular calcifications. Small joint effusion.  - Pain control: Pain improved  - PT    #HTN  - start amlodipine  - C/w atenolol    #Hypothyroid  - cont home med 89 yo female, pmh of htn, hypothyroidism BL knee OA presents to er for left knee pain. Pt has chronic BL knee pain. Receives steroid injections intermittently Last injection 9 months ago. today worse pain, needed walker to ambulate and knee gave out on her- unable to ambulate 2/2 to pain, no falls or head injury. denies numbness, tingling, fever, chills.    In ED  VS: /81, otherwise wnl  Received Tylenol and admitted to [facility]    Hospital Course     #B/L knee pain 2/2 OA  - unable to ambulate on LLE  - L Knee X-ray: No acute fracture or dislocation. Mild tricompartmental osteoarthritis. Chondrocalcinosis. There is flattening of the medial femoral condyle, likely sequela of prior subchondral insufficiency fracture. Vascular calcifications. Small joint effusion.  - Pain control: Pain improved  - PT --> ambulated 75 feet. has cane and rollator at home and uses as needed.  discharge recommendations: out-patient PT    #HTN  - start amlodipine  - C/w atenolol    #Hypothyroid  - cont home med      Patient agreeable for discharge home today.  Plan for discharge home with home care.

## 2025-05-26 NOTE — PHYSICAL THERAPY INITIAL EVALUATION ADULT - ADDITIONAL COMMENTS
Pt states she lives in an apartment on the first floor. states she lives by herself and does not use an AD unless her knee acts up. reports she is independent and does not have any help. uses cane or rollator when her knee acts up

## 2025-05-26 NOTE — PHYSICAL THERAPY INITIAL EVALUATION ADULT - GENERAL OBSERVATIONS, REHAB EVAL
8:55-9:20. Pt chart reviewed and is able to be seen by PT at this time as discussed with RN. Pt agreeable to participate in PT IE. Pt encountered in bedside chair, +IV in NAD.

## 2025-05-26 NOTE — PHYSICAL THERAPY INITIAL EVALUATION ADULT - LEVEL OF INDEPENDENCE: SIT/SUPINE, REHAB EVAL
did not assess, pt was in bedside chair. at beginning and end of session.did not assess, pt was in bedside chair. at beginning and end of session.

## 2025-05-26 NOTE — DISCHARGE NOTE NURSING/CASE MANAGEMENT/SOCIAL WORK - FINANCIAL ASSISTANCE
[facility] provides services at a reduced cost to those who are determined to be eligible through [facility]’s financial assistance program. Information regarding [facility]’s financial assistance program can be found by going to [email or web address] or by calling [phone].

## 2025-05-26 NOTE — DISCHARGE NOTE PROVIDER - PROVIDER TOKENS
PROVIDER:[TOKEN:[[id number]],FOLLOWUP:[2 weeks]] PROVIDER:[TOKEN:[[id number]],FOLLOWUP:[2 weeks]],PROVIDER:[TOKEN:[[id number]]]

## 2025-05-26 NOTE — DISCHARGE NOTE PROVIDER - ATTENDING DISCHARGE PHYSICAL EXAMINATION:
VITALS:  Vital Signs Last 24 Hrs  T(C): 36.6 (26 May 2025 07:33), Max: 36.7 (25 May 2025 19:45)  T(F): 97.8 (26 May 2025 07:33), Max: 98.1 (25 May 2025 19:45)  HR: 65 (26 May 2025 07:33) (61 - 70)  BP: 138/70 (26 May 2025 07:33) (138/70 - 173/81)  BP(mean): 98 (26 May 2025 05:30) (98 - 98)  RR: 18 (25 May 2025 23:55) (18 - 18)  SpO2: 95% (25 May 2025 23:55) (95% - 96%)    Parameters below as of 25 May 2025 23:55  Patient On (Oxygen Delivery Method): room air      I&O's Detail      PHYSICAL EXAM:  GENERAL: NAD  CHEST/LUNG: CTAB; No wheeze  HEART: RRR; S1/S2  ABDOMEN: Soft, NT/ND; BS present  EXTREMITIES:  No cyanosis, or edema,  no warmth, redness or tenderness to palpation noted on left knee.  NEUROLOGY: AAOx3  SKIN: No rashes or lesions on visible areas

## 2025-05-26 NOTE — DISCHARGE NOTE PROVIDER - NSDCCPCAREPLAN_GEN_ALL_CORE_FT
PRINCIPAL DISCHARGE DIAGNOSIS  Diagnosis: Inability to ambulate due to left knee  Assessment and Plan of Treatment: You have knee pain because of osteoarthritis. Osteoarthritis caused by wear and tear.  HOME CARE INSTRUCTIONS  Take medicines only as directed by your health care provider.   Rest your knee and keep it raised (elevated) while you are resting.  Do not do things that cause or worsen pain.  Avoid high-impact activities or exercises, such as running, jumping rope, or doing jumping jacks.  Apply ice to the knee area:  Put ice in a plastic bag.  Place a towel between your skin and the bag.  Leave the ice on for 20 minutes, 2–3 times a day.  Ask your health care provider if you should wear an elastic knee support.  Keep a pillow under your knee when you sleep.  Lose weight if you are overweight. Extra weight can put pressure on your knee.  Do not use any tobacco products, including cigarettes, chewing tobacco, or electronic cigarettes. If you need help quitting, ask your health care provider. Smoking may slow the healing of any bone and joint problems that you may have.  Follow with your PCP after discharge.     PRINCIPAL DISCHARGE DIAGNOSIS  Diagnosis: Inability to ambulate due to left knee  Assessment and Plan of Treatment: You have knee pain because of osteoarthritis. Osteoarthritis caused by wear and tear.  HOME CARE INSTRUCTIONS  Take medicines only as directed by your health care provider.   Rest your knee and keep it raised (elevated) while you are resting.  Do not do things that cause or worsen pain.  Avoid high-impact activities or exercises, such as running, jumping rope, or doing jumping jacks.  Apply ice to the knee area:  Put ice in a plastic bag.  Place a towel between your skin and the bag.  Leave the ice on for 20 minutes, 2–3 times a day.  Ask your health care provider if you should wear an elastic knee support.  Keep a pillow under your knee when you sleep.  Lose weight if you are overweight. Extra weight can put pressure on your knee.  Do not use any tobacco products, including cigarettes, chewing tobacco, or electronic cigarettes. If you need help quitting, ask your health care provider. Smoking may slow the healing of any bone and joint problems that you may have.  Follow with your PCP and Orthopedic surgeon in 1-2 weeks after discharge.

## 2025-06-02 DIAGNOSIS — M11.262 OTHER CHONDROCALCINOSIS, LEFT KNEE: ICD-10-CM

## 2025-06-02 DIAGNOSIS — Z79.890 HORMONE REPLACEMENT THERAPY: ICD-10-CM

## 2025-06-02 DIAGNOSIS — M17.0 BILATERAL PRIMARY OSTEOARTHRITIS OF KNEE: ICD-10-CM

## 2025-06-02 DIAGNOSIS — E03.9 HYPOTHYROIDISM, UNSPECIFIED: ICD-10-CM

## 2025-06-02 DIAGNOSIS — I10 ESSENTIAL (PRIMARY) HYPERTENSION: ICD-10-CM

## 2025-06-02 DIAGNOSIS — M25.462 EFFUSION, LEFT KNEE: ICD-10-CM

## 2025-06-17 ENCOUNTER — APPOINTMENT (OUTPATIENT)
Dept: ORTHOPEDIC SURGERY | Facility: CLINIC | Age: 89
End: 2025-06-17
Payer: MEDICARE

## 2025-06-17 PROCEDURE — 99213 OFFICE O/P EST LOW 20 MIN: CPT | Mod: 25

## 2025-06-17 PROCEDURE — 20610 DRAIN/INJ JOINT/BURSA W/O US: CPT | Mod: 50

## 2025-06-17 RX ORDER — DICLOFENAC SODIUM 3 G/100G
3 GEL TOPICAL TWICE DAILY
Qty: 1 | Refills: 2 | Status: ACTIVE | COMMUNITY
Start: 2025-06-17 | End: 1900-01-01